# Patient Record
Sex: FEMALE | Race: WHITE | NOT HISPANIC OR LATINO | Employment: FULL TIME | URBAN - METROPOLITAN AREA
[De-identification: names, ages, dates, MRNs, and addresses within clinical notes are randomized per-mention and may not be internally consistent; named-entity substitution may affect disease eponyms.]

---

## 2023-03-20 LAB
ALBUMIN (G/DL) IN SER/PLAS: 4.2 G/DL (ref 3.4–5)
ANION GAP IN SER/PLAS: 13 MMOL/L (ref 10–20)
CALCIUM (MG/DL) IN SER/PLAS: 9.1 MG/DL (ref 8.6–10.3)
CARBON DIOXIDE, TOTAL (MMOL/L) IN SER/PLAS: 26 MMOL/L (ref 21–32)
CHLORIDE (MMOL/L) IN SER/PLAS: 103 MMOL/L (ref 98–107)
CREATININE (MG/DL) IN SER/PLAS: 0.84 MG/DL (ref 0.5–1.05)
GFR FEMALE: 81 ML/MIN/1.73M2
GLUCOSE (MG/DL) IN SER/PLAS: 74 MG/DL (ref 74–99)
PHOSPHATE (MG/DL) IN SER/PLAS: 2.8 MG/DL (ref 2.5–4.9)
POTASSIUM (MMOL/L) IN SER/PLAS: 3.9 MMOL/L (ref 3.5–5.3)
SODIUM (MMOL/L) IN SER/PLAS: 138 MMOL/L (ref 136–145)
THYROTROPIN (MIU/L) IN SER/PLAS BY DETECTION LIMIT <= 0.05 MIU/L: 0.61 MIU/L (ref 0.44–3.98)
THYROXINE (T4) FREE (NG/DL) IN SER/PLAS: 1.09 NG/DL (ref 0.61–1.12)
UREA NITROGEN (MG/DL) IN SER/PLAS: 13 MG/DL (ref 6–23)

## 2023-03-21 LAB — PROGESTERONE (NG/ML) IN SER/PLAS: <0.3 NG/ML

## 2023-03-23 LAB
6-ACETYLMORPHINE: <25 NG/ML
7-AMINOCLONAZEPAM: <25 NG/ML
ALPHA-HYDROXYALPRAZOLAM: <25 NG/ML
ALPHA-HYDROXYMIDAZOLAM: <25 NG/ML
ALPRAZOLAM: <25 NG/ML
AMPHETAMINE (PRESENCE) IN URINE BY SCREEN METHOD: NORMAL
BARBITURATES PRESENCE IN URINE BY SCREEN METHOD: NORMAL
CANNABINOIDS IN URINE BY SCREEN METHOD: NORMAL
CHLORDIAZEPOXIDE: <25 NG/ML
CLONAZEPAM: <25 NG/ML
COCAINE (PRESENCE) IN URINE BY SCREEN METHOD: NORMAL
CODEINE: <50 NG/ML
CREATINE, URINE FOR DRUG: 90.4 MG/DL
DIAZEPAM: <25 NG/ML
DRUG SCREEN COMMENT URINE: NORMAL
EDDP: <25 NG/ML
FENTANYL CONFIRMATION, URINE: <2.5 NG/ML
HYDROCODONE: <25 NG/ML
HYDROMORPHONE: <25 NG/ML
LORAZEPAM: <25 NG/ML
METHADONE CONFIRMATION,URINE: <25 NG/ML
MIDAZOLAM: <25 NG/ML
MORPHINE URINE: <50 NG/ML
NORDIAZEPAM: <25 NG/ML
NORFENTANYL: <2.5 NG/ML
NORHYDROCODONE: <25 NG/ML
NOROXYCODONE: <25 NG/ML
O-DESMETHYLTRAMADOL: <50 NG/ML
OXAZEPAM: <25 NG/ML
OXYCODONE: <25 NG/ML
OXYMORPHONE: <25 NG/ML
PHENCYCLIDINE (PRESENCE) IN URINE BY SCREEN METHOD: NORMAL
TEMAZEPAM: <25 NG/ML
TRAMADOL: <50 NG/ML
ZOLPIDEM METABOLITE (ZCA): <25 NG/ML
ZOLPIDEM: <25 NG/ML

## 2023-03-25 LAB
TESTOSTERONE FREE (CHAN): 1.8 PG/ML (ref 0.1–6.4)
TESTOSTERONE,TOTAL,LC-MS/MS: 10 NG/DL (ref 2–45)

## 2023-03-31 ENCOUNTER — TELEMEDICINE (OUTPATIENT)
Dept: PRIMARY CARE | Facility: CLINIC | Age: 58
End: 2023-03-31
Payer: COMMERCIAL

## 2023-03-31 DIAGNOSIS — F51.01 PRIMARY INSOMNIA: ICD-10-CM

## 2023-03-31 DIAGNOSIS — F41.9 ANXIETY: Primary | ICD-10-CM

## 2023-03-31 PROCEDURE — 99213 OFFICE O/P EST LOW 20 MIN: CPT | Performed by: INTERNAL MEDICINE

## 2023-03-31 RX ORDER — GABAPENTIN 100 MG/1
400 CAPSULE ORAL NIGHTLY
Qty: 120 CAPSULE | Refills: 1 | Status: SHIPPED | OUTPATIENT
Start: 2023-03-31 | End: 2023-04-30

## 2023-03-31 RX ORDER — LORAZEPAM 1 MG/1
1 TABLET ORAL 2 TIMES DAILY PRN
Qty: 6 TABLET | Refills: 0 | Status: SHIPPED | OUTPATIENT
Start: 2023-03-31 | End: 2023-04-03

## 2023-03-31 RX ORDER — TRAZODONE HYDROCHLORIDE 100 MG/1
100 TABLET ORAL NIGHTLY PRN
Qty: 30 TABLET | Refills: 3 | Status: SHIPPED | OUTPATIENT
Start: 2023-03-31 | End: 2023-04-30

## 2023-03-31 RX ORDER — GABAPENTIN 100 MG/1
400 CAPSULE ORAL NIGHTLY
COMMUNITY
Start: 2020-01-17 | End: 2023-03-31 | Stop reason: SDUPTHER

## 2023-03-31 NOTE — PROGRESS NOTES
Telemedicine visit is conducted with the patient with his/ her consent about the medical problem as documented below.   Communication with the patient is face to face over the secured video call.    Subjective   Patient ID: Chelsie Lucio is a 58 y.o. female who called office for worsening of symptoms of Anxiety and is having insomnia and wanted to try some medicine.    HPI   Patient recently had normal urine drug test.    Review of Systems   Constitutional:  Negative for activity change, appetite change, fatigue, fever and unexpected weight change.   HENT:  Negative for dental problem, ear discharge, hearing loss, nosebleeds, postnasal drip, sinus pain, sore throat, trouble swallowing and voice change.    Eyes:  Negative for photophobia, pain and visual disturbance.   Respiratory:  Negative for chest tightness, shortness of breath, wheezing and stridor.    Cardiovascular:  Negative for chest pain, palpitations and leg swelling.   Gastrointestinal:  Negative for abdominal pain, blood in stool, constipation, diarrhea, nausea and vomiting.   Endocrine: Negative for polydipsia, polyphagia and polyuria.   Genitourinary:  Negative for decreased urine volume, dyspareunia, dysuria, flank pain and urgency.   Musculoskeletal:  Negative for back pain, gait problem, myalgias and neck pain.   Skin:  Negative for rash and wound.   Allergic/Immunologic: Negative for environmental allergies and food allergies.   Neurological:  Negative for dizziness, seizures, syncope, facial asymmetry, speech difficulty, weakness, numbness and headaches.   Hematological:  Negative for adenopathy.   Psychiatric/Behavioral:  Negative for behavioral problems, confusion, hallucinations, sleep disturbance and suicidal ideas. The patient is nervous/anxious.        Objective   There were no vitals taken for this visit.    Physical Exam  Virtual Physical Exam  Awake alert and oriented x 3. Following directions and replying to all questions  appropriately.  No use of accessory muscle of respiration. No acute respiratory distress.  Moving all extremities.  Clear bilateral conjunctiva.  No visible skin rash over the face and extremities.  Joints movements of UE and bilateral knee is normal.  Normal mood with appropriate effect and emotions.    Rest of the physical exam is limited due to virtual nature of this visit.    Assessment/Plan   Problem List Items Addressed This Visit    None  Visit Diagnoses       Anxiety    -  Primary    Relevant Medications    LORazepam (Ativan) 1 mg tablet    gabapentin (Neurontin) 100 mg capsule    Primary insomnia        Relevant Medications    traZODone (Desyrel) 100 mg tablet   RTC at your next office visit.

## 2023-04-03 ASSESSMENT — ENCOUNTER SYMPTOMS
NECK PAIN: 0
BLOOD IN STOOL: 0
WEAKNESS: 0
SINUS PAIN: 0
SLEEP DISTURBANCE: 0
MYALGIAS: 0
POLYPHAGIA: 0
DIZZINESS: 0
CHEST TIGHTNESS: 0
NAUSEA: 0
CONFUSION: 0
SHORTNESS OF BREATH: 0
STRIDOR: 0
HEADACHES: 0
CONSTIPATION: 0
WHEEZING: 0
ACTIVITY CHANGE: 0
EYE PAIN: 0
VOMITING: 0
APPETITE CHANGE: 0
TROUBLE SWALLOWING: 0
ABDOMINAL PAIN: 0
WOUND: 0
FEVER: 0
VOICE CHANGE: 0
SEIZURES: 0
NUMBNESS: 0
FATIGUE: 0
SPEECH DIFFICULTY: 0
DYSURIA: 0
POLYDIPSIA: 0
FACIAL ASYMMETRY: 0
DIARRHEA: 0
SORE THROAT: 0
PHOTOPHOBIA: 0
ADENOPATHY: 0
BACK PAIN: 0
NERVOUS/ANXIOUS: 1
UNEXPECTED WEIGHT CHANGE: 0
HALLUCINATIONS: 0
PALPITATIONS: 0
FLANK PAIN: 0

## 2023-04-11 ENCOUNTER — TELEPHONE (OUTPATIENT)
Dept: PRIMARY CARE | Facility: CLINIC | Age: 58
End: 2023-04-11
Payer: COMMERCIAL

## 2023-04-11 DIAGNOSIS — M54.50 ACUTE LOW BACK PAIN WITHOUT SCIATICA, UNSPECIFIED BACK PAIN LATERALITY: Primary | ICD-10-CM

## 2023-04-11 PROBLEM — E78.5 HYPERLIPIDEMIA: Status: ACTIVE | Noted: 2023-04-11

## 2023-04-11 PROBLEM — F43.20 ADJUSTMENT DISORDER: Status: ACTIVE | Noted: 2023-04-11

## 2023-04-11 PROBLEM — J20.9 ACUTE BRONCHITIS: Status: RESOLVED | Noted: 2023-04-11 | Resolved: 2023-04-11

## 2023-04-11 PROBLEM — M25.519 SHOULDER PAIN: Status: ACTIVE | Noted: 2023-04-11

## 2023-04-11 PROBLEM — M85.60 BONE CYST: Status: ACTIVE | Noted: 2023-04-11

## 2023-04-11 PROBLEM — M79.7 FIBROMYALGIA: Status: ACTIVE | Noted: 2023-04-11

## 2023-04-11 PROBLEM — M62.838 MUSCLE SPASM: Status: ACTIVE | Noted: 2023-04-11

## 2023-04-11 PROBLEM — N28.89 RIGHT RENAL MASS: Status: ACTIVE | Noted: 2023-04-11

## 2023-04-11 PROBLEM — M54.9 BACK PAIN: Status: ACTIVE | Noted: 2023-04-11

## 2023-04-11 PROBLEM — L30.4 INTERTRIGO: Status: ACTIVE | Noted: 2023-04-11

## 2023-04-11 PROBLEM — J01.90 ACUTE SINUSITIS: Status: RESOLVED | Noted: 2023-04-11 | Resolved: 2023-04-11

## 2023-04-11 PROBLEM — M25.552 HIP PAIN, LEFT: Status: ACTIVE | Noted: 2023-04-11

## 2023-04-11 PROBLEM — L03.115 CELLULITIS OF LEG WITHOUT FOOT, RIGHT: Status: RESOLVED | Noted: 2023-04-11 | Resolved: 2023-04-11

## 2023-04-11 PROBLEM — E53.8 VITAMIN B12 DEFICIENCY: Status: ACTIVE | Noted: 2023-04-11

## 2023-04-11 PROBLEM — K21.9 GASTROESOPHAGEAL REFLUX DISEASE: Status: ACTIVE | Noted: 2023-04-11

## 2023-04-11 PROBLEM — B37.9 YEAST INFECTION: Status: RESOLVED | Noted: 2023-04-11 | Resolved: 2023-04-11

## 2023-04-11 PROBLEM — E55.9 VITAMIN D DEFICIENCY: Status: ACTIVE | Noted: 2023-04-11

## 2023-04-11 PROBLEM — R29.6 RECURRENT FALLS WHILE WALKING: Status: ACTIVE | Noted: 2023-04-11

## 2023-04-11 PROBLEM — E66.9 OBESITY: Status: ACTIVE | Noted: 2023-04-11

## 2023-04-11 PROBLEM — F41.9 ANXIETY: Status: ACTIVE | Noted: 2023-04-11

## 2023-04-11 PROBLEM — G47.00 INSOMNIA: Status: ACTIVE | Noted: 2023-04-11

## 2023-04-11 PROBLEM — E03.9 HYPOTHYROIDISM: Status: ACTIVE | Noted: 2023-04-11

## 2023-04-11 RX ORDER — VALACYCLOVIR HYDROCHLORIDE 1 G/1
1 TABLET, FILM COATED ORAL EVERY 12 HOURS
COMMUNITY
Start: 2017-12-07 | End: 2024-02-06 | Stop reason: WASHOUT

## 2023-04-11 RX ORDER — OMEPRAZOLE 40 MG/1
1 CAPSULE, DELAYED RELEASE ORAL DAILY
COMMUNITY
Start: 2015-04-15 | End: 2023-05-10 | Stop reason: SDUPTHER

## 2023-04-11 RX ORDER — RESVER/WINE/BFL/GRPSD/PC/C/POM 200MG-60MG
CAPSULE ORAL
COMMUNITY
Start: 2023-02-06

## 2023-04-11 RX ORDER — MONTELUKAST SODIUM 10 MG/1
1 TABLET ORAL DAILY
COMMUNITY
Start: 2018-04-09 | End: 2023-05-10 | Stop reason: SDUPTHER

## 2023-04-11 RX ORDER — METHYLPREDNISOLONE 4 MG/1
TABLET ORAL
Qty: 21 TABLET | Refills: 0 | Status: SHIPPED | OUTPATIENT
Start: 2023-04-11 | End: 2023-04-18

## 2023-04-11 RX ORDER — GLUCOSAMINE/MSM/CHONDROIT SULF 500-166.6
TABLET ORAL
COMMUNITY
Start: 2010-06-03 | End: 2024-05-01 | Stop reason: WASHOUT

## 2023-04-11 RX ORDER — ROSUVASTATIN CALCIUM 5 MG/1
1 TABLET, COATED ORAL DAILY
COMMUNITY
Start: 2019-07-16

## 2023-04-12 NOTE — TELEPHONE ENCOUNTER
Attempted to contact patient by phone multiple times, number is busy. Sent a my chart message to patient.

## 2023-05-05 RX ORDER — MELOXICAM 15 MG/1
TABLET ORAL
COMMUNITY
Start: 2023-03-01 | End: 2023-05-10 | Stop reason: SDUPTHER

## 2023-05-05 RX ORDER — TETANUS TOXOID, REDUCED DIPHTHERIA TOXOID AND ACELLULAR PERTUSSIS VACCINE, ADSORBED 5; 2.5; 8; 8; 2.5 [IU]/.5ML; [IU]/.5ML; UG/.5ML; UG/.5ML; UG/.5ML
SUSPENSION INTRAMUSCULAR
COMMUNITY
Start: 2021-11-17 | End: 2024-02-06 | Stop reason: WASHOUT

## 2023-05-05 RX ORDER — DULOXETIN HYDROCHLORIDE 20 MG/1
1 CAPSULE, DELAYED RELEASE ORAL 2 TIMES DAILY
COMMUNITY
Start: 2021-06-02 | End: 2024-02-06 | Stop reason: WASHOUT

## 2023-05-05 RX ORDER — ESZOPICLONE 3 MG/1
TABLET, FILM COATED ORAL
COMMUNITY
Start: 2010-06-03 | End: 2023-08-04 | Stop reason: SDUPTHER

## 2023-05-05 RX ORDER — LEVOTHYROXINE SODIUM 100 UG/1
1 TABLET ORAL DAILY
COMMUNITY
Start: 2021-06-02 | End: 2024-02-06 | Stop reason: WASHOUT

## 2023-05-05 RX ORDER — ETODOLAC 400 MG/1
TABLET, FILM COATED ORAL
COMMUNITY
Start: 2016-06-29 | End: 2024-02-06 | Stop reason: WASHOUT

## 2023-05-05 RX ORDER — CYCLOBENZAPRINE HCL 10 MG
1 TABLET ORAL 3 TIMES DAILY PRN
COMMUNITY
Start: 2020-10-26 | End: 2023-05-10 | Stop reason: SDUPTHER

## 2023-05-05 RX ORDER — MELOXICAM 7.5 MG/1
TABLET ORAL
COMMUNITY
Start: 2022-11-15 | End: 2023-05-10 | Stop reason: WASHOUT

## 2023-05-05 RX ORDER — FUROSEMIDE 20 MG/1
TABLET ORAL
COMMUNITY
Start: 2022-12-15 | End: 2023-08-04 | Stop reason: WASHOUT

## 2023-05-05 RX ORDER — LEVOTHYROXINE SODIUM 125 UG/1
TABLET ORAL
COMMUNITY
Start: 2023-03-01 | End: 2024-02-06 | Stop reason: WASHOUT

## 2023-05-05 RX ORDER — CETIRIZINE HYDROCHLORIDE, PSEUDOEPHEDRINE HYDROCHLORIDE 5; 120 MG/1; MG/1
1 TABLET, FILM COATED, EXTENDED RELEASE ORAL 2 TIMES DAILY PRN
COMMUNITY

## 2023-05-05 RX ORDER — CLOTRIMAZOLE AND BETAMETHASONE DIPROPIONATE 10; .64 MG/G; MG/G
CREAM TOPICAL 2 TIMES DAILY
COMMUNITY
Start: 2017-12-07 | End: 2024-02-06 | Stop reason: WASHOUT

## 2023-05-05 RX ORDER — ALBUTEROL SULFATE 90 UG/1
AEROSOL, METERED RESPIRATORY (INHALATION)
COMMUNITY
End: 2024-02-03 | Stop reason: SDUPTHER

## 2023-05-05 RX ORDER — ASPIRIN 81 MG/1
1 TABLET ORAL DAILY
COMMUNITY

## 2023-05-05 RX ORDER — FLUTICASONE PROPIONATE 50 MCG
SPRAY, SUSPENSION (ML) NASAL
COMMUNITY
Start: 2019-03-23 | End: 2023-07-05

## 2023-05-05 RX ORDER — ESTRADIOL 0.05 MG/D
FILM, EXTENDED RELEASE TRANSDERMAL
COMMUNITY
Start: 2010-06-03 | End: 2023-05-10 | Stop reason: SDUPTHER

## 2023-05-05 RX ORDER — HYDROCODONE BITARTRATE AND ACETAMINOPHEN 5; 325 MG/1; MG/1
TABLET ORAL
COMMUNITY
Start: 2022-06-14 | End: 2024-02-06 | Stop reason: WASHOUT

## 2023-05-05 RX ORDER — IBUPROFEN 600 MG/1
TABLET ORAL
COMMUNITY
Start: 2022-06-13

## 2023-05-10 ENCOUNTER — TELEMEDICINE (OUTPATIENT)
Dept: PRIMARY CARE | Facility: CLINIC | Age: 58
End: 2023-05-10
Payer: COMMERCIAL

## 2023-05-10 DIAGNOSIS — K21.9 GASTROESOPHAGEAL REFLUX DISEASE WITHOUT ESOPHAGITIS: ICD-10-CM

## 2023-05-10 DIAGNOSIS — N89.8 VAGINAL DRYNESS: ICD-10-CM

## 2023-05-10 DIAGNOSIS — R06.02 SHORTNESS OF BREATH: ICD-10-CM

## 2023-05-10 DIAGNOSIS — M79.2 NEUROPATHIC PAIN: Primary | ICD-10-CM

## 2023-05-10 PROCEDURE — 99213 OFFICE O/P EST LOW 20 MIN: CPT | Performed by: INTERNAL MEDICINE

## 2023-05-10 RX ORDER — ESTRADIOL 0.05 MG/D
1 FILM, EXTENDED RELEASE TRANSDERMAL
Qty: 12 PATCH | Refills: 1 | Status: SHIPPED | OUTPATIENT
Start: 2023-05-10 | End: 2023-08-04

## 2023-05-10 RX ORDER — GABAPENTIN 400 MG/1
400 CAPSULE ORAL 3 TIMES DAILY
Qty: 90 CAPSULE | Refills: 1 | Status: SHIPPED | OUTPATIENT
Start: 2023-05-10 | End: 2023-07-05

## 2023-05-10 RX ORDER — MONTELUKAST SODIUM 10 MG/1
10 TABLET ORAL DAILY
Qty: 90 TABLET | Refills: 1 | Status: SHIPPED | OUTPATIENT
Start: 2023-05-10 | End: 2023-09-28

## 2023-05-10 RX ORDER — NALOXONE HYDROCHLORIDE 4 MG/.1ML
4 SPRAY NASAL AS NEEDED
Qty: 2 EACH | Refills: 1 | Status: SHIPPED | OUTPATIENT
Start: 2023-05-10 | End: 2024-02-06 | Stop reason: WASHOUT

## 2023-05-10 RX ORDER — CYCLOBENZAPRINE HCL 10 MG
10 TABLET ORAL 3 TIMES DAILY PRN
Qty: 260 TABLET | Refills: 1 | Status: SHIPPED | OUTPATIENT
Start: 2023-05-10 | End: 2023-08-08

## 2023-05-10 RX ORDER — OMEPRAZOLE 40 MG/1
40 CAPSULE, DELAYED RELEASE ORAL DAILY
Qty: 90 CAPSULE | Refills: 1 | Status: SHIPPED | OUTPATIENT
Start: 2023-05-10 | End: 2023-09-28

## 2023-05-10 RX ORDER — GABAPENTIN 100 MG/1
100 CAPSULE ORAL 3 TIMES DAILY
Qty: 90 CAPSULE | Refills: 1 | Status: SHIPPED | OUTPATIENT
Start: 2023-05-10 | End: 2023-07-05

## 2023-05-10 RX ORDER — MELOXICAM 15 MG/1
TABLET ORAL
Qty: 90 TABLET | Refills: 1 | Status: SHIPPED | OUTPATIENT
Start: 2023-05-10 | End: 2023-09-28

## 2023-05-10 ASSESSMENT — ENCOUNTER SYMPTOMS
SINUS PAIN: 0
WOUND: 0
POLYPHAGIA: 0
DYSURIA: 0
DIZZINESS: 0
WHEEZING: 0
PALPITATIONS: 0
ABDOMINAL PAIN: 0
VOICE CHANGE: 0
SEIZURES: 0
ADENOPATHY: 0
CONFUSION: 0
FACIAL ASYMMETRY: 0
STRIDOR: 0
MYALGIAS: 1
BACK PAIN: 0
VOMITING: 0
WEAKNESS: 0
SLEEP DISTURBANCE: 0
FEVER: 0
SHORTNESS OF BREATH: 0
NAUSEA: 0
CONSTIPATION: 0
SPEECH DIFFICULTY: 0
DIARRHEA: 0
PHOTOPHOBIA: 0
BLOOD IN STOOL: 0
NERVOUS/ANXIOUS: 0
ARTHRALGIAS: 1
NECK PAIN: 0
NUMBNESS: 0
HALLUCINATIONS: 0
EYE PAIN: 0
HEADACHES: 0
UNEXPECTED WEIGHT CHANGE: 0
ACTIVITY CHANGE: 0
APPETITE CHANGE: 0
CHEST TIGHTNESS: 0
FATIGUE: 0
TROUBLE SWALLOWING: 0
SORE THROAT: 0
FLANK PAIN: 0
POLYDIPSIA: 0

## 2023-05-10 NOTE — PROGRESS NOTES
Telemedicine visit is conducted with the patient with her consent about the medical problem as documented below.   Communication with the patient is face to face over the secured video call.    Subjective   Patient ID: Chelsie Lucio is a 58 y.o. female who called office to get prescription renewed. She still have baseline Neuropathic pain and after increasing gabapentin she feels little better and want to continue same dose and also want to try additional 100 mg of Gabapentin in the day if it control her symptoms more without side effects.    HPI   She denied for any other specific symptoms.    Review of Systems   Constitutional:  Negative for activity change, appetite change, fatigue, fever and unexpected weight change.   HENT:  Negative for dental problem, ear discharge, hearing loss, nosebleeds, postnasal drip, sinus pain, sore throat, trouble swallowing and voice change.    Eyes:  Negative for photophobia, pain and visual disturbance.   Respiratory:  Negative for chest tightness, shortness of breath, wheezing and stridor.    Cardiovascular:  Negative for chest pain, palpitations and leg swelling.   Gastrointestinal:  Negative for abdominal pain, blood in stool, constipation, diarrhea, nausea and vomiting.   Endocrine: Negative for polydipsia, polyphagia and polyuria.   Genitourinary:  Negative for decreased urine volume, dyspareunia, dysuria, flank pain and urgency.   Musculoskeletal:  Positive for arthralgias and myalgias. Negative for back pain, gait problem and neck pain.   Skin:  Negative for rash and wound.   Allergic/Immunologic: Negative for environmental allergies and food allergies.   Neurological:  Negative for dizziness, seizures, syncope, facial asymmetry, speech difficulty, weakness, numbness and headaches.   Hematological:  Negative for adenopathy.   Psychiatric/Behavioral:  Negative for behavioral problems, confusion, hallucinations, sleep disturbance and suicidal ideas. The patient is not  nervous/anxious.      Objective   There were no vitals taken for this visit.    Physical Exam  Virtual Physical Exam  Awake alert and oriented x 3. Following directions and replying to all questions appropriately.  No use of accessory muscle of respiration. No acute respiratory distress.  Moving all extremities.  Clear bilateral conjunctiva.  No visible skin rash over the face and extremities.  Joints movements of UE and bilateral knee is normal.  Normal mood with appropriate effect and emotions.    Rest of the physical exam is limited due to virtual nature of this visit.    Assessment/Plan   Problem List Items Addressed This Visit          Digestive    Gastroesophageal reflux disease    Relevant Medications    omeprazole (PriLOSEC) 40 mg DR capsule     Other Visit Diagnoses       Neuropathic pain    -  Primary    Relevant Medications    gabapentin (Neurontin) 400 mg capsule    gabapentin (Neurontin) 100 mg capsule    cyclobenzaprine (Flexeril) 10 mg tablet    naloxone (Narcan) 4 mg/0.1 mL nasal spray    Shortness of breath        Relevant Medications    montelukast (Singulair) 10 mg tablet    meloxicam (Mobic) 15 mg tablet    Vaginal dryness        Relevant Medications    estradiol (Vivelle-DOT) 0.05 mg/24 hr patch

## 2023-06-30 DIAGNOSIS — J30.9 ALLERGIC RHINITIS, UNSPECIFIED SEASONALITY, UNSPECIFIED TRIGGER: Primary | ICD-10-CM

## 2023-06-30 DIAGNOSIS — M79.2 NEUROPATHIC PAIN: ICD-10-CM

## 2023-06-30 RX ORDER — CELECOXIB 100 MG/1
CAPSULE ORAL
COMMUNITY
Start: 2023-05-16

## 2023-07-05 RX ORDER — GABAPENTIN 400 MG/1
CAPSULE ORAL
Qty: 180 CAPSULE | Refills: 5 | Status: SHIPPED | OUTPATIENT
Start: 2023-07-05 | End: 2024-02-06 | Stop reason: WASHOUT

## 2023-07-05 RX ORDER — GABAPENTIN 100 MG/1
CAPSULE ORAL
Qty: 180 CAPSULE | Refills: 5 | Status: SHIPPED | OUTPATIENT
Start: 2023-07-05

## 2023-07-05 RX ORDER — FLUTICASONE PROPIONATE 50 MCG
SPRAY, SUSPENSION (ML) NASAL
Qty: 48 G | Refills: 1 | Status: SHIPPED | OUTPATIENT
Start: 2023-07-05 | End: 2023-12-05

## 2023-08-03 DIAGNOSIS — Z13.820 ENCOUNTER FOR SCREENING FOR OSTEOPOROSIS: ICD-10-CM

## 2023-08-03 DIAGNOSIS — Z12.31 ENCOUNTER FOR SCREENING MAMMOGRAM FOR MALIGNANT NEOPLASM OF BREAST: ICD-10-CM

## 2023-08-04 ENCOUNTER — OFFICE VISIT (OUTPATIENT)
Dept: PRIMARY CARE | Facility: CLINIC | Age: 58
End: 2023-08-04
Payer: COMMERCIAL

## 2023-08-04 ENCOUNTER — TELEMEDICINE (OUTPATIENT)
Dept: PRIMARY CARE | Facility: CLINIC | Age: 58
End: 2023-08-04
Payer: COMMERCIAL

## 2023-08-04 DIAGNOSIS — N89.8 VAGINAL DRYNESS: ICD-10-CM

## 2023-08-04 DIAGNOSIS — F51.01 PRIMARY INSOMNIA: ICD-10-CM

## 2023-08-04 DIAGNOSIS — E78.2 MIXED HYPERLIPIDEMIA: ICD-10-CM

## 2023-08-04 DIAGNOSIS — R73.9 HYPERGLYCEMIA: ICD-10-CM

## 2023-08-04 DIAGNOSIS — R60.9 SWELLING: Primary | ICD-10-CM

## 2023-08-04 DIAGNOSIS — E55.9 VITAMIN D DEFICIENCY: ICD-10-CM

## 2023-08-04 DIAGNOSIS — Z11.59 NEED FOR HEPATITIS C SCREENING TEST: Primary | ICD-10-CM

## 2023-08-04 DIAGNOSIS — Z13.29 THYROID DISORDER SCREENING: ICD-10-CM

## 2023-08-04 DIAGNOSIS — Z11.4 ENCOUNTER FOR SCREENING FOR HIV: ICD-10-CM

## 2023-08-04 PROCEDURE — 99214 OFFICE O/P EST MOD 30 MIN: CPT | Performed by: INTERNAL MEDICINE

## 2023-08-04 PROCEDURE — 1036F TOBACCO NON-USER: CPT | Performed by: INTERNAL MEDICINE

## 2023-08-04 RX ORDER — ESTRADIOL 0.05 MG/D
FILM, EXTENDED RELEASE TRANSDERMAL
Qty: 24 PATCH | Refills: 2 | Status: SHIPPED | OUTPATIENT
Start: 2023-08-04 | End: 2024-03-14

## 2023-08-04 RX ORDER — ESZOPICLONE 3 MG/1
3 TABLET, FILM COATED ORAL NIGHTLY
Qty: 60 TABLET | Refills: 0 | Status: SHIPPED | OUTPATIENT
Start: 2023-08-04 | End: 2023-09-11 | Stop reason: SDUPTHER

## 2023-08-04 RX ORDER — FUROSEMIDE 40 MG/1
40 TABLET ORAL DAILY
Qty: 30 TABLET | Refills: 2 | Status: SHIPPED | OUTPATIENT
Start: 2023-08-04 | End: 2024-08-03

## 2023-08-04 ASSESSMENT — ANXIETY QUESTIONNAIRES
7. FEELING AFRAID AS IF SOMETHING AWFUL MIGHT HAPPEN: NOT AT ALL
3. WORRYING TOO MUCH ABOUT DIFFERENT THINGS: NOT AT ALL
2. NOT BEING ABLE TO STOP OR CONTROL WORRYING: NOT AT ALL
5. BEING SO RESTLESS THAT IT IS HARD TO SIT STILL: SEVERAL DAYS
1. FEELING NERVOUS, ANXIOUS, OR ON EDGE: MORE THAN HALF THE DAYS
4. TROUBLE RELAXING: SEVERAL DAYS
6. BECOMING EASILY ANNOYED OR IRRITABLE: NOT AT ALL
GAD7 TOTAL SCORE: 4
IF YOU CHECKED OFF ANY PROBLEMS ON THIS QUESTIONNAIRE, HOW DIFFICULT HAVE THESE PROBLEMS MADE IT FOR YOU TO DO YOUR WORK, TAKE CARE OF THINGS AT HOME, OR GET ALONG WITH OTHER PEOPLE: SOMEWHAT DIFFICULT

## 2023-08-04 ASSESSMENT — ENCOUNTER SYMPTOMS
APPETITE CHANGE: 0
UNEXPECTED WEIGHT CHANGE: 0
FREQUENCY: 0
FEVER: 0
SHORTNESS OF BREATH: 0
NUMBNESS: 0
UNEXPECTED WEIGHT CHANGE: 0
SORE THROAT: 0
COUGH: 0
POLYDIPSIA: 0
POLYPHAGIA: 0
PALPITATIONS: 0
FEVER: 0
SHORTNESS OF BREATH: 0
SORE THROAT: 0
ARTHRALGIAS: 0
HALLUCINATIONS: 0
TREMORS: 0
VOICE CHANGE: 0
FLANK PAIN: 0
COUGH: 0
FACIAL ASYMMETRY: 0
SINUS PAIN: 0
ABDOMINAL PAIN: 0
NAUSEA: 0
CONSTIPATION: 0
HEMATURIA: 0
NERVOUS/ANXIOUS: 0
PHOTOPHOBIA: 0
HEADACHES: 0
WHEEZING: 0
SPEECH DIFFICULTY: 0
CONSTIPATION: 0
ABDOMINAL PAIN: 0
SLEEP DISTURBANCE: 0
PHOTOPHOBIA: 0
ARTHRALGIAS: 1
FACIAL ASYMMETRY: 0
HALLUCINATIONS: 0
DYSURIA: 0
MYALGIAS: 1
ACTIVITY CHANGE: 0
VOICE CHANGE: 0
VOMITING: 0
HEADACHES: 0
NECK PAIN: 0
DIARRHEA: 0
STRIDOR: 0
SEIZURES: 0
TROUBLE SWALLOWING: 0
BLOOD IN STOOL: 0
EYE PAIN: 0
WOUND: 0
ACTIVITY CHANGE: 0
CONFUSION: 0
BACK PAIN: 0
TREMORS: 0
POLYDIPSIA: 0
CHEST TIGHTNESS: 0
ADENOPATHY: 0
BACK PAIN: 0
DIARRHEA: 0
CONFUSION: 0
STRIDOR: 0
ADENOPATHY: 0
APPETITE CHANGE: 0
SLEEP DISTURBANCE: 1
FATIGUE: 0
POLYPHAGIA: 0
SEIZURES: 0
NUMBNESS: 0
DYSURIA: 0
DIZZINESS: 0
SPEECH DIFFICULTY: 0
HEMATURIA: 0
PALPITATIONS: 0
NERVOUS/ANXIOUS: 0
WEAKNESS: 0
WHEEZING: 0
SINUS PAIN: 0
NAUSEA: 0
BLOOD IN STOOL: 0
EYE PAIN: 0
CHEST TIGHTNESS: 0
FLANK PAIN: 0
WOUND: 0
DIZZINESS: 0
FATIGUE: 0
WEAKNESS: 0
VOMITING: 0
NECK PAIN: 0
TROUBLE SWALLOWING: 0
MYALGIAS: 0

## 2023-08-04 NOTE — PROGRESS NOTES
Telemedicine visit is conducted with the patient with her consent about the medical problem as documented below.   Communication with the patient is face to face over the secured video call.    Subjective   Patient ID: Chelsie Lucio is a 58 y.o. female who presents for No chief complaint on file..    HPI   She is having swelling on all body. She was given Lasix before but patient did not take it.    She also has difficulty in sleeping and Trazodone is not working even after increasing the dose and she would like to try Lunesta again.    She is still rotating gabapentin between 100 to 400 mg per day based on symptoms.    She want estrogen patch for vaginal dryness and she doesn't remember the name and will call my office back later to update it.    Review of Systems   Constitutional:  Negative for activity change, appetite change, fatigue, fever and unexpected weight change.   HENT:  Negative for dental problem, ear discharge, hearing loss, nosebleeds, postnasal drip, sinus pain, sore throat, trouble swallowing and voice change.    Eyes:  Negative for photophobia, pain and visual disturbance.   Respiratory:  Negative for cough, chest tightness, shortness of breath, wheezing and stridor.    Cardiovascular:  Positive for leg swelling. Negative for chest pain and palpitations.   Gastrointestinal:  Negative for abdominal pain, blood in stool, constipation, diarrhea, nausea and vomiting.   Endocrine: Negative for polydipsia, polyphagia and polyuria.   Genitourinary:  Negative for decreased urine volume, dyspareunia, dysuria, flank pain, hematuria and urgency.   Musculoskeletal:  Positive for arthralgias and myalgias. Negative for back pain, gait problem and neck pain.   Skin:  Negative for rash and wound.   Allergic/Immunologic: Negative for environmental allergies and food allergies.   Neurological:  Negative for dizziness, tremors, seizures, syncope, facial asymmetry, speech difficulty, weakness, numbness and headaches.    Hematological:  Negative for adenopathy.   Psychiatric/Behavioral:  Positive for sleep disturbance. Negative for behavioral problems, confusion, hallucinations and suicidal ideas. The patient is not nervous/anxious.      Objective   There were no vitals taken for this visit.    Physical Exam  Virtual Physical Exam  Awake alert and oriented x 3. Following directions and replying to all questions appropriately.  No use of accessory muscle of respiration. No acute respiratory distress.  Moving all extremities.  Clear bilateral conjunctiva.  No visible skin rash over the face and extremities.  Joints movements of UE and bilateral knee is normal.  Normal mood with appropriate effect and emotions.    Rest of the physical exam is limited due to virtual nature of this visit.    Assessment/Plan   Problem List Items Addressed This Visit          Genitourinary and Reproductive    Vaginal dryness     Call office about specific estradiol patch so I can send it to the pharmacy.            Sleep    Insomnia     Please come to the office to get contract signed for Lunesta.            Symptoms and Signs    Swelling - Primary    Relevant Medications    furosemide (Lasix) 40 mg tablet

## 2023-08-04 NOTE — PROGRESS NOTES
Subjective   Patient ID: Chelsie Lucio is a 58 y.o. female who presents for No chief complaint on file..    HPI   Patient presented to the office for follow up to get labs and also need medicine Lunesta as trazodone is not working.     She also need labs which has been ordered today.    Patient also noticed swelling of all over the body especially legs and she was given Lasix in the past but she did not take it.    She is still rotating gabapentin between 100 to 400 mg per day based on symptoms.     She want estrogen patch for vaginal dryness and she doesn't remember the name and will call my office back later to update it.    Review of Systems   Constitutional:  Negative for activity change, appetite change, fatigue, fever and unexpected weight change.   HENT:  Negative for dental problem, ear discharge, hearing loss, nosebleeds, postnasal drip, sinus pain, sore throat, trouble swallowing and voice change.    Eyes:  Negative for photophobia, pain and visual disturbance.   Respiratory:  Negative for cough, chest tightness, shortness of breath, wheezing and stridor.    Cardiovascular:  Positive for leg swelling. Negative for chest pain and palpitations.   Gastrointestinal:  Negative for abdominal pain, blood in stool, constipation, diarrhea, nausea and vomiting.   Endocrine: Negative for polydipsia, polyphagia and polyuria.   Genitourinary:  Negative for decreased urine volume, dyspareunia, dysuria, flank pain, frequency, hematuria and urgency.   Musculoskeletal:  Negative for arthralgias, back pain, gait problem, myalgias and neck pain.   Skin:  Negative for rash and wound.   Allergic/Immunologic: Negative for environmental allergies and food allergies.   Neurological:  Negative for dizziness, tremors, seizures, syncope, facial asymmetry, speech difficulty, weakness, numbness and headaches.   Hematological:  Negative for adenopathy.   Psychiatric/Behavioral:  Negative for behavioral problems, confusion,  hallucinations, sleep disturbance and suicidal ideas. The patient is not nervous/anxious.      Objective   There were no vitals taken for this visit.    Physical Exam  Constitutional:       General: She is not in acute distress.     Appearance: Normal appearance. She is obese. She is not ill-appearing or toxic-appearing.   HENT:      Head: Normocephalic and atraumatic.      Nose: Nose normal.   Eyes:      General:         Right eye: No discharge.         Left eye: No discharge.      Extraocular Movements: Extraocular movements intact.      Conjunctiva/sclera: Conjunctivae normal.      Pupils: Pupils are equal, round, and reactive to light.   Cardiovascular:      Rate and Rhythm: Normal rate and regular rhythm.      Pulses: Normal pulses.      Heart sounds: Normal heart sounds. No murmur heard.     No gallop.   Pulmonary:      Effort: Pulmonary effort is normal. No respiratory distress.      Breath sounds: Normal breath sounds. No stridor. No wheezing or rales.   Musculoskeletal:         General: No swelling or deformity. Normal range of motion.      Cervical back: Normal range of motion and neck supple. No rigidity or tenderness.      Right lower leg: No edema.      Left lower leg: No edema.   Skin:     General: Skin is warm.      Coloration: Skin is not jaundiced.      Findings: No bruising, erythema or rash.   Neurological:      General: No focal deficit present.      Mental Status: She is alert and oriented to person, place, and time.      Cranial Nerves: No cranial nerve deficit.      Gait: Gait normal.   Psychiatric:         Mood and Affect: Mood normal.         Behavior: Behavior normal.         Thought Content: Thought content normal.         Judgment: Judgment normal.       Assessment/Plan   Problem List Items Addressed This Visit          Cardiac and Vasculature    Hyperlipidemia    Relevant Orders    CBC and Auto Differential    Comprehensive Metabolic Panel    Lipid Panel       Endocrine/Metabolic     Vitamin D deficiency    Relevant Orders    Vitamin D, Total       Sleep    Insomnia    Relevant Medications    eszopiclone (Lunesta) 3 mg tablet     Other Visit Diagnoses       Need for hepatitis C screening test    -  Primary    Relevant Orders    Hepatitis C Antibody    Hyperglycemia        Relevant Orders    Hemoglobin A1C    Encounter for screening for HIV        Relevant Orders    HIV 1/2 Antigen/Antibody Screen with Reflex to Confirmation    Thyroid disorder screening        Relevant Orders    TSH with reflex to Free T4 if abnormal        [facility]:  Aquiles Mcclain MD on 8/4/2023 10:48 PM  I have personally reviewed the [facility] report for Chelsie Lucio. I have considered the risks of abuse, dependence, addiction and diversion and I believe that it is clinically appropriate for Chelsie Lucio to be prescribed this medication    Is the patient prescribed a combination of a benzodiazepine and opioid?  Yes, I feel it is clincially indicated to continue the medication and have discussed with the patient risks/benefits/alternatives.    Last Urine Drug Screen / ordered today: No  Recent Results (from the past 88128 hour(s))   OPIATE/OPIOID/BENZO PRESCRIPTION COMPLIANCE    Collection Time: 03/20/23  4:07 PM   Result Value Ref Range    DRUG SCREEN COMMENT URINE SEE BELOW     Creatine, Urine 90.4 mg/dL    Amphetamine Screen, Urine PRESUMPTIVE NEGATIVE NEGATIVE    Barbiturate Screen, Urine PRESUMPTIVE NEGATIVE NEGATIVE    Cannabinoid Screen, Urine PRESUMPTIVE NEGATIVE NEGATIVE    Cocaine Screen, Urine PRESUMPTIVE NEGATIVE NEGATIVE    PCP Screen, Urine PRESUMPTIVE NEGATIVE NEGATIVE    7-Aminoclonazepam <25 Cutoff <25 ng/mL    Alpha-Hydroxyalprazolam <25 Cutoff <25 ng/mL    Alpha-Hydroxymidazolam <25 Cutoff <25 ng/mL    Alprazolam <25 Cutoff <25 ng/mL    Chlordiazepoxide <25 Cutoff <25 ng/mL    Clonazepam <25 Cutoff <25 ng/mL    Diazepam <25 Cutoff <25 ng/mL    Lorazepam <25 Cutoff <25 ng/mL    Midazolam <25 Cutoff <25 ng/mL     Nordiazepam <25 Cutoff <25 ng/mL    Oxazepam <25 Cutoff <25 ng/mL    Temazepam <25 Cutoff <25 ng/mL    Zolpidem <25 Cutoff <25 ng/mL    Zolpidem Metabolite (ZCA) <25 Cutoff <25 ng/mL    6-Acetylmorphine <25 Cutoff <25 ng/mL    Codeine <50 Cutoff <50 ng/mL    Hydrocodone <25 Cutoff <25 ng/mL    Hydromorphone <25 Cutoff <25 ng/mL    Morphine Urine <50 Cutoff <50 ng/mL    Norhydrocodone <25 Cutoff <25 ng/mL    Noroxycodone <25 Cutoff <25 ng/mL    Oxycodone <25 Cutoff <25 ng/mL    Oxymorphone <25 Cutoff <25 ng/mL    Tramadol <50 Cutoff <50 ng/mL    O-Desmethyltramadol <50 Cutoff <50 ng/mL    Fentanyl <2.5 Cutoff<2.5 ng/mL    Norfentanyl <2.5 Cutoff<2.5 ng/mL    METHADONE CONFIRMATION,URINE <25 Cutoff <25 ng/mL    EDDP <25 Cutoff <25 ng/mL     Results are as expected.     Controlled Substance Agreement:  Date of the Last Agreement: 2023  Reviewed Controlled Substance Agreement including but not limited to the benefits, risks, and alternatives to treatment with a Controlled Substance medication(s).    Benzodiazepines:  What is the patient's goal of therapy? To sleep better  Is this being achieved with current treatment? It was being achieved in the past.    FRANCOIS-7:  Over the last 2 weeks, how often have you been bothered by any of the following problems?  Feeling nervous, anxious, or on edge: 2  Not being able to stop or control worryin  Worrying too much about different things: 0  Trouble relaxin  Being so restless that it is hard to sit still: 1  Becoming easily annoyed or irritable: 0  Feeling afraid as if something awful might happen: 0  FRANCOIS-7 Total Score: 4      Activities of Daily Living:   Is your overall impression that this patient is benefiting (symptom reduction outweighs side effects) from benzodiazepine therapy? Will start treatment today.    1. Physical Functioning: Same  2. Family Relationship: Same  3. Social Relationship: Same  4. Mood: Same  5. Sleep Patterns: Same  6. Overall Function: Same

## 2023-08-07 ENCOUNTER — TELEPHONE (OUTPATIENT)
Dept: PRIMARY CARE | Facility: CLINIC | Age: 58
End: 2023-08-07
Payer: COMMERCIAL

## 2023-08-07 DIAGNOSIS — R11.0 NAUSEA: Primary | ICD-10-CM

## 2023-08-07 RX ORDER — ONDANSETRON 4 MG/1
4 TABLET, FILM COATED ORAL EVERY 8 HOURS PRN
Qty: 30 TABLET | Refills: 0 | Status: SHIPPED | OUTPATIENT
Start: 2023-08-07 | End: 2023-08-21

## 2023-08-07 NOTE — TELEPHONE ENCOUNTER
Patient seen Friday. Started furosemide 40mg. C/o nausea. Is there something she can take for nausea? Or should she cut medication in half? Has not taken medication today.

## 2023-09-11 DIAGNOSIS — F51.01 PRIMARY INSOMNIA: ICD-10-CM

## 2023-09-11 RX ORDER — ESZOPICLONE 3 MG/1
3 TABLET, FILM COATED ORAL NIGHTLY
Qty: 90 TABLET | Refills: 0 | Status: SHIPPED | OUTPATIENT
Start: 2023-09-11 | End: 2023-12-05

## 2023-09-27 ENCOUNTER — TELEPHONE (OUTPATIENT)
Dept: PRIMARY CARE | Facility: CLINIC | Age: 58
End: 2023-09-27
Payer: COMMERCIAL

## 2023-09-27 DIAGNOSIS — K21.9 GASTROESOPHAGEAL REFLUX DISEASE WITHOUT ESOPHAGITIS: ICD-10-CM

## 2023-09-27 DIAGNOSIS — R06.02 SHORTNESS OF BREATH: ICD-10-CM

## 2023-09-27 DIAGNOSIS — R30.0 DYSURIA: Primary | ICD-10-CM

## 2023-09-27 NOTE — TELEPHONE ENCOUNTER
Patient called in wanting to know if you could order a UA for her to get done in [place]. States she is having burning, cloudy urine, itching and irritation. Patient states she can not come in to be seen this week because she has too much going one. States she can come in next week. Last seen 8/4

## 2023-09-28 RX ORDER — MELOXICAM 15 MG/1
TABLET ORAL
Qty: 90 TABLET | Refills: 3 | Status: SHIPPED | OUTPATIENT
Start: 2023-09-28

## 2023-09-28 RX ORDER — OMEPRAZOLE 40 MG/1
40 CAPSULE, DELAYED RELEASE ORAL DAILY
Qty: 90 CAPSULE | Refills: 3 | Status: SHIPPED | OUTPATIENT
Start: 2023-09-28

## 2023-09-28 RX ORDER — MONTELUKAST SODIUM 10 MG/1
10 TABLET ORAL DAILY
Qty: 90 TABLET | Refills: 3 | Status: SHIPPED | OUTPATIENT
Start: 2023-09-28

## 2023-12-04 DIAGNOSIS — J30.9 ALLERGIC RHINITIS, UNSPECIFIED SEASONALITY, UNSPECIFIED TRIGGER: ICD-10-CM

## 2023-12-04 DIAGNOSIS — F51.01 PRIMARY INSOMNIA: ICD-10-CM

## 2023-12-05 RX ORDER — ESZOPICLONE 3 MG/1
3 TABLET, FILM COATED ORAL NIGHTLY
Qty: 90 TABLET | Refills: 0 | Status: SHIPPED | OUTPATIENT
Start: 2023-12-05 | End: 2024-02-03 | Stop reason: SDUPTHER

## 2023-12-05 RX ORDER — FLUTICASONE PROPIONATE 50 MCG
SPRAY, SUSPENSION (ML) NASAL
Qty: 48 G | Refills: 1 | Status: SHIPPED | OUTPATIENT
Start: 2023-12-05 | End: 2024-05-16 | Stop reason: SDUPTHER

## 2023-12-27 ENCOUNTER — TELEPHONE (OUTPATIENT)
Dept: PRIMARY CARE | Facility: CLINIC | Age: 58
End: 2023-12-27
Payer: COMMERCIAL

## 2023-12-27 DIAGNOSIS — M19.90 ARTHRITIS: Primary | ICD-10-CM

## 2023-12-27 NOTE — TELEPHONE ENCOUNTER
Patient states has cpe in February. Wanting to get labs now due to met deductible. Wanting creatinine and DEBRA panel as well.

## 2023-12-28 ENCOUNTER — ANCILLARY PROCEDURE (OUTPATIENT)
Dept: RADIOLOGY | Facility: CLINIC | Age: 58
End: 2023-12-28
Payer: COMMERCIAL

## 2023-12-28 ENCOUNTER — LAB (OUTPATIENT)
Dept: LAB | Facility: LAB | Age: 58
End: 2023-12-28
Payer: COMMERCIAL

## 2023-12-28 DIAGNOSIS — M25.561 CHRONIC PAIN OF BOTH KNEES: Primary | ICD-10-CM

## 2023-12-28 DIAGNOSIS — M19.90 ARTHRITIS: ICD-10-CM

## 2023-12-28 DIAGNOSIS — Z13.29 THYROID DISORDER SCREENING: ICD-10-CM

## 2023-12-28 DIAGNOSIS — G89.29 CHRONIC PAIN OF BOTH KNEES: Primary | ICD-10-CM

## 2023-12-28 DIAGNOSIS — E78.2 MIXED HYPERLIPIDEMIA: ICD-10-CM

## 2023-12-28 DIAGNOSIS — Z79.899 MEDICATION MANAGEMENT: ICD-10-CM

## 2023-12-28 DIAGNOSIS — M25.561 CHRONIC PAIN OF BOTH KNEES: ICD-10-CM

## 2023-12-28 DIAGNOSIS — Z11.59 NEED FOR HEPATITIS C SCREENING TEST: ICD-10-CM

## 2023-12-28 DIAGNOSIS — R30.0 DYSURIA: ICD-10-CM

## 2023-12-28 DIAGNOSIS — Z11.4 ENCOUNTER FOR SCREENING FOR HIV: ICD-10-CM

## 2023-12-28 DIAGNOSIS — M25.562 CHRONIC PAIN OF BOTH KNEES: Primary | ICD-10-CM

## 2023-12-28 DIAGNOSIS — G89.29 CHRONIC PAIN OF BOTH KNEES: ICD-10-CM

## 2023-12-28 DIAGNOSIS — E55.9 VITAMIN D DEFICIENCY: ICD-10-CM

## 2023-12-28 DIAGNOSIS — M25.562 CHRONIC PAIN OF BOTH KNEES: ICD-10-CM

## 2023-12-28 DIAGNOSIS — R73.9 HYPERGLYCEMIA: ICD-10-CM

## 2023-12-28 LAB
ALBUMIN SERPL BCP-MCNC: 4.2 G/DL (ref 3.4–5)
ALP SERPL-CCNC: 56 U/L (ref 33–110)
ALT SERPL W P-5'-P-CCNC: 19 U/L (ref 7–45)
AMORPH CRY #/AREA UR COMP ASSIST: ABNORMAL /HPF
ANION GAP SERPL CALC-SCNC: 14 MMOL/L (ref 10–20)
APPEARANCE UR: ABNORMAL
AST SERPL W P-5'-P-CCNC: 14 U/L (ref 9–39)
BACTERIA #/AREA URNS AUTO: ABNORMAL /HPF
BASOPHILS # BLD AUTO: 0.05 X10*3/UL (ref 0–0.1)
BASOPHILS NFR BLD AUTO: 0.7 %
BILIRUB SERPL-MCNC: 0.4 MG/DL (ref 0–1.2)
BILIRUB UR STRIP.AUTO-MCNC: NEGATIVE MG/DL
BUN SERPL-MCNC: 17 MG/DL (ref 6–23)
CALCIUM SERPL-MCNC: 9.1 MG/DL (ref 8.6–10.3)
CHLORIDE SERPL-SCNC: 107 MMOL/L (ref 98–107)
CHOLEST SERPL-MCNC: 251 MG/DL (ref 0–199)
CHOLESTEROL/HDL RATIO: 4.9
CO2 SERPL-SCNC: 26 MMOL/L (ref 21–32)
COLOR UR: YELLOW
CREAT SERPL-MCNC: 0.75 MG/DL (ref 0.5–1.05)
EOSINOPHIL # BLD AUTO: 0.18 X10*3/UL (ref 0–0.7)
EOSINOPHIL NFR BLD AUTO: 2.6 %
ERYTHROCYTE [DISTWIDTH] IN BLOOD BY AUTOMATED COUNT: 13.9 % (ref 11.5–14.5)
GFR SERPL CREATININE-BSD FRML MDRD: >90 ML/MIN/1.73M*2
GLUCOSE SERPL-MCNC: 83 MG/DL (ref 74–99)
GLUCOSE UR STRIP.AUTO-MCNC: NEGATIVE MG/DL
HCT VFR BLD AUTO: 42.6 % (ref 36–46)
HDLC SERPL-MCNC: 51.6 MG/DL
HGB BLD-MCNC: 13.7 G/DL (ref 12–16)
IMM GRANULOCYTES # BLD AUTO: 0.01 X10*3/UL (ref 0–0.7)
IMM GRANULOCYTES NFR BLD AUTO: 0.1 % (ref 0–0.9)
KETONES UR STRIP.AUTO-MCNC: NEGATIVE MG/DL
LDLC SERPL CALC-MCNC: 154 MG/DL
LEUKOCYTE ESTERASE UR QL STRIP.AUTO: ABNORMAL
LYMPHOCYTES # BLD AUTO: 2.49 X10*3/UL (ref 1.2–4.8)
LYMPHOCYTES NFR BLD AUTO: 35.6 %
MCH RBC QN AUTO: 29.1 PG (ref 26–34)
MCHC RBC AUTO-ENTMCNC: 32.2 G/DL (ref 32–36)
MCV RBC AUTO: 90 FL (ref 80–100)
MONOCYTES # BLD AUTO: 0.56 X10*3/UL (ref 0.1–1)
MONOCYTES NFR BLD AUTO: 8 %
MUCOUS THREADS #/AREA URNS AUTO: ABNORMAL /LPF
NEUTROPHILS # BLD AUTO: 3.7 X10*3/UL (ref 1.2–7.7)
NEUTROPHILS NFR BLD AUTO: 53 %
NITRITE UR QL STRIP.AUTO: NEGATIVE
NON HDL CHOLESTEROL: 199 MG/DL (ref 0–149)
NRBC BLD-RTO: 0 /100 WBCS (ref 0–0)
PH UR STRIP.AUTO: 5 [PH]
PLATELET # BLD AUTO: 269 X10*3/UL (ref 150–450)
POTASSIUM SERPL-SCNC: 4.2 MMOL/L (ref 3.5–5.3)
PROT SERPL-MCNC: 6.1 G/DL (ref 6.4–8.2)
PROT UR STRIP.AUTO-MCNC: NEGATIVE MG/DL
RBC # BLD AUTO: 4.71 X10*6/UL (ref 4–5.2)
RBC # UR STRIP.AUTO: NEGATIVE /UL
RBC #/AREA URNS AUTO: ABNORMAL /HPF
SODIUM SERPL-SCNC: 143 MMOL/L (ref 136–145)
SP GR UR STRIP.AUTO: 1.02
SQUAMOUS #/AREA URNS AUTO: ABNORMAL /HPF
TRIGL SERPL-MCNC: 228 MG/DL (ref 0–149)
TSH SERPL-ACNC: 2.57 MIU/L (ref 0.44–3.98)
UROBILINOGEN UR STRIP.AUTO-MCNC: <2 MG/DL
VLDL: 46 MG/DL (ref 0–40)
WBC # BLD AUTO: 7 X10*3/UL (ref 4.4–11.3)
WBC #/AREA URNS AUTO: ABNORMAL /HPF

## 2023-12-28 PROCEDURE — 73560 X-RAY EXAM OF KNEE 1 OR 2: CPT | Mod: 50

## 2023-12-28 PROCEDURE — 86803 HEPATITIS C AB TEST: CPT

## 2023-12-28 PROCEDURE — 83036 HEMOGLOBIN GLYCOSYLATED A1C: CPT

## 2023-12-28 PROCEDURE — 82306 VITAMIN D 25 HYDROXY: CPT

## 2023-12-28 PROCEDURE — 84443 ASSAY THYROID STIM HORMONE: CPT

## 2023-12-28 PROCEDURE — 85025 COMPLETE CBC W/AUTO DIFF WBC: CPT

## 2023-12-28 PROCEDURE — 80307 DRUG TEST PRSMV CHEM ANLYZR: CPT

## 2023-12-28 PROCEDURE — 81001 URINALYSIS AUTO W/SCOPE: CPT

## 2023-12-28 PROCEDURE — 80053 COMPREHEN METABOLIC PANEL: CPT

## 2023-12-28 PROCEDURE — 80061 LIPID PANEL: CPT

## 2023-12-28 PROCEDURE — 73564 X-RAY EXAM KNEE 4 OR MORE: CPT | Mod: 50

## 2023-12-28 PROCEDURE — 87389 HIV-1 AG W/HIV-1&-2 AB AG IA: CPT

## 2023-12-28 PROCEDURE — 86038 ANTINUCLEAR ANTIBODIES: CPT

## 2023-12-28 PROCEDURE — 36415 COLL VENOUS BLD VENIPUNCTURE: CPT

## 2023-12-28 PROCEDURE — 73562 X-RAY EXAM OF KNEE 3: CPT | Mod: BILATERAL PROCEDURE | Performed by: RADIOLOGY

## 2023-12-29 ENCOUNTER — TELEMEDICINE (OUTPATIENT)
Dept: PRIMARY CARE | Facility: CLINIC | Age: 58
End: 2023-12-29
Payer: COMMERCIAL

## 2023-12-29 DIAGNOSIS — N30.00 ACUTE CYSTITIS WITHOUT HEMATURIA: Primary | ICD-10-CM

## 2023-12-29 DIAGNOSIS — J45.20 MILD INTERMITTENT ASTHMA WITHOUT COMPLICATION (HHS-HCC): ICD-10-CM

## 2023-12-29 LAB
25(OH)D3 SERPL-MCNC: 51 NG/ML (ref 30–100)
AMPHETAMINES UR QL SCN: NORMAL
ANA SER QL HEP2 SUBST: NEGATIVE
BARBITURATES UR QL SCN: NORMAL
BENZODIAZ UR QL SCN: NORMAL
BZE UR QL SCN: NORMAL
CANNABINOIDS UR QL SCN: NORMAL
EST. AVERAGE GLUCOSE BLD GHB EST-MCNC: 100 MG/DL
FENTANYL+NORFENTANYL UR QL SCN: NORMAL
HBA1C MFR BLD: 5.1 %
HCV AB SER QL: NONREACTIVE
HIV 1+2 AB+HIV1 P24 AG SERPL QL IA: NONREACTIVE
OPIATES UR QL SCN: NORMAL
OXYCODONE+OXYMORPHONE UR QL SCN: NORMAL
PCP UR QL SCN: NORMAL

## 2023-12-29 PROCEDURE — 99212 OFFICE O/P EST SF 10 MIN: CPT | Performed by: INTERNAL MEDICINE

## 2023-12-29 RX ORDER — DOXYCYCLINE 100 MG/1
100 CAPSULE ORAL 2 TIMES DAILY
Qty: 14 CAPSULE | Refills: 0 | Status: SHIPPED | OUTPATIENT
Start: 2023-12-29 | End: 2024-01-05

## 2023-12-29 ASSESSMENT — ENCOUNTER SYMPTOMS
FATIGUE: 0
NERVOUS/ANXIOUS: 0
EYE PAIN: 0
CONSTIPATION: 0
ARTHRALGIAS: 0
UNEXPECTED WEIGHT CHANGE: 0
SEIZURES: 0
ABDOMINAL PAIN: 0
NAUSEA: 0
FREQUENCY: 1
SPEECH DIFFICULTY: 0
SHORTNESS OF BREATH: 0
POLYDIPSIA: 0
FEVER: 0
DIZZINESS: 0
HALLUCINATIONS: 0
NECK PAIN: 0
DECREASED CONCENTRATION: 0
PHOTOPHOBIA: 0
FLANK PAIN: 0
COUGH: 0
APPETITE CHANGE: 0
SINUS PAIN: 0
BACK PAIN: 0
WOUND: 0
SLEEP DISTURBANCE: 0
JOINT SWELLING: 0
FACIAL ASYMMETRY: 0
SORE THROAT: 0
VOICE CHANGE: 0
VOMITING: 0
HEADACHES: 0
STRIDOR: 0
NUMBNESS: 0
BLOOD IN STOOL: 0
DIARRHEA: 0
POLYPHAGIA: 0
DYSURIA: 0
DYSPHORIC MOOD: 0
WHEEZING: 0
PALPITATIONS: 0
ADENOPATHY: 0
TROUBLE SWALLOWING: 0
ACTIVITY CHANGE: 0
WEAKNESS: 0
CONFUSION: 0
MYALGIAS: 0
COLOR CHANGE: 0
CHEST TIGHTNESS: 0
TREMORS: 0
HEMATURIA: 0

## 2023-12-29 NOTE — TELEPHONE ENCOUNTER
Pt called asking if she has an UTI she saw her labs and she is having sx with frequency, abd tenderness, some burning with urination.   ALLERGIES:   Ciprofloxacin  Unknown Not Specified Allergy 5/5/2023      Adverse Reactions/Drug Intolerances      Mold  Shortness of breath High Intolerance 6/3/2010

## 2023-12-29 NOTE — TELEPHONE ENCOUNTER
Make a virtual visit for Antibiotic. I can't prescribe antibiotic without talking. I can do it today.

## 2024-01-31 ENCOUNTER — APPOINTMENT (OUTPATIENT)
Dept: PRIMARY CARE | Facility: CLINIC | Age: 59
End: 2024-01-31
Payer: COMMERCIAL

## 2024-02-02 ENCOUNTER — OFFICE VISIT (OUTPATIENT)
Dept: PRIMARY CARE | Facility: CLINIC | Age: 59
End: 2024-02-02
Payer: COMMERCIAL

## 2024-02-02 VITALS
TEMPERATURE: 97 F | WEIGHT: 248 LBS | BODY MASS INDEX: 41.32 KG/M2 | HEART RATE: 71 BPM | SYSTOLIC BLOOD PRESSURE: 134 MMHG | DIASTOLIC BLOOD PRESSURE: 84 MMHG | HEIGHT: 65 IN | OXYGEN SATURATION: 99 %

## 2024-02-02 DIAGNOSIS — F51.01 PRIMARY INSOMNIA: Primary | ICD-10-CM

## 2024-02-02 DIAGNOSIS — J45.20 MILD INTERMITTENT ASTHMA WITHOUT COMPLICATION (HHS-HCC): ICD-10-CM

## 2024-02-02 PROCEDURE — 99213 OFFICE O/P EST LOW 20 MIN: CPT | Performed by: INTERNAL MEDICINE

## 2024-02-02 PROCEDURE — 1036F TOBACCO NON-USER: CPT | Performed by: INTERNAL MEDICINE

## 2024-02-02 NOTE — PROGRESS NOTES
"Subjective   Patient ID: Chelsie Lucio is a 58 y.o. female who presents for Follow-up and Med Refill.    HPI   Patient presented to the office for medicine refill.   She fell down few times as her knee give away. Luckily she did not hit herself in any fall.    Review of Systems   Constitutional:  Negative for activity change, appetite change, fatigue, fever and unexpected weight change.   HENT:  Negative for dental problem, ear discharge, hearing loss, nosebleeds, postnasal drip, sinus pain, sore throat, trouble swallowing and voice change.    Eyes:  Negative for photophobia, pain and visual disturbance.   Respiratory:  Negative for cough, chest tightness, shortness of breath, wheezing and stridor.    Cardiovascular:  Negative for chest pain, palpitations and leg swelling.   Gastrointestinal:  Negative for abdominal pain, blood in stool, constipation, diarrhea, nausea and vomiting.   Endocrine: Negative for polydipsia, polyphagia and polyuria.   Genitourinary:  Negative for decreased urine volume, dyspareunia, dysuria, flank pain, frequency, hematuria and urgency.   Musculoskeletal:  Positive for arthralgias, back pain and myalgias. Negative for gait problem, joint swelling and neck pain.   Skin:  Negative for color change, rash and wound.   Allergic/Immunologic: Negative for environmental allergies and food allergies.   Neurological:  Negative for dizziness, tremors, seizures, syncope, facial asymmetry, speech difficulty, weakness, numbness and headaches.   Hematological:  Negative for adenopathy.   Psychiatric/Behavioral:  Positive for sleep disturbance. Negative for behavioral problems, confusion, hallucinations, self-injury and suicidal ideas. The patient is not nervous/anxious.      Objective   /84   Pulse 71   Temp 36.1 °C (97 °F)   Ht 1.651 m (5' 5\")   Wt 112 kg (248 lb)   SpO2 99%   BMI 41.27 kg/m²     Physical Exam  Constitutional:       General: She is not in acute distress.     Appearance: " Normal appearance. She is obese. She is not ill-appearing or toxic-appearing.   HENT:      Head: Normocephalic and atraumatic.      Nose: Nose normal.   Eyes:      General:         Right eye: No discharge.         Left eye: No discharge.      Extraocular Movements: Extraocular movements intact.      Conjunctiva/sclera: Conjunctivae normal.      Pupils: Pupils are equal, round, and reactive to light.   Cardiovascular:      Rate and Rhythm: Normal rate and regular rhythm.      Pulses: Normal pulses.      Heart sounds: Normal heart sounds. No murmur heard.  Pulmonary:      Effort: Pulmonary effort is normal. No respiratory distress.      Breath sounds: Normal breath sounds. No stridor. No wheezing or rales.   Abdominal:      General: Bowel sounds are normal.      Tenderness: There is no abdominal tenderness. There is no right CVA tenderness or left CVA tenderness.   Musculoskeletal:         General: No swelling or deformity. Normal range of motion.      Cervical back: Normal range of motion and neck supple. No rigidity or tenderness.      Right lower leg: No edema.      Left lower leg: No edema.   Skin:     General: Skin is warm.      Coloration: Skin is not jaundiced.      Findings: No bruising, erythema or rash.   Neurological:      General: No focal deficit present.      Mental Status: She is alert and oriented to person, place, and time.      Cranial Nerves: No cranial nerve deficit.      Gait: Gait normal.   Psychiatric:         Mood and Affect: Mood normal.         Behavior: Behavior normal.         Thought Content: Thought content normal.         Judgment: Judgment normal.       Assessment/Plan   Problem List Items Addressed This Visit          Pulmonary and Pneumonias    Asthma    Relevant Medications    albuterol 90 mcg/actuation inhaler       Sleep    Insomnia - Primary    Relevant Medications    eszopiclone (Lunesta) 3 mg tablet

## 2024-02-03 RX ORDER — ALBUTEROL SULFATE 90 UG/1
2 AEROSOL, METERED RESPIRATORY (INHALATION) EVERY 6 HOURS PRN
Qty: 18 G | Refills: 3 | Status: SHIPPED | OUTPATIENT
Start: 2024-02-03

## 2024-02-03 RX ORDER — ESZOPICLONE 3 MG/1
3 TABLET, FILM COATED ORAL NIGHTLY
Qty: 90 TABLET | Refills: 0 | Status: SHIPPED | OUTPATIENT
Start: 2024-02-03 | End: 2024-05-01 | Stop reason: SDUPTHER

## 2024-02-06 ASSESSMENT — ENCOUNTER SYMPTOMS
VOICE CHANGE: 0
SLEEP DISTURBANCE: 1
CHEST TIGHTNESS: 0
NECK PAIN: 0
JOINT SWELLING: 0
WOUND: 0
APPETITE CHANGE: 0
BACK PAIN: 1
DIARRHEA: 0
NAUSEA: 0
PHOTOPHOBIA: 0
PALPITATIONS: 0
WEAKNESS: 0
CONSTIPATION: 0
FATIGUE: 0
WHEEZING: 0
NERVOUS/ANXIOUS: 0
NUMBNESS: 0
CONFUSION: 0
ACTIVITY CHANGE: 0
ABDOMINAL PAIN: 0
STRIDOR: 0
DYSURIA: 0
VOMITING: 0
SORE THROAT: 0
ARTHRALGIAS: 1
TREMORS: 0
COUGH: 0
HEMATURIA: 0
SPEECH DIFFICULTY: 0
EYE PAIN: 0
UNEXPECTED WEIGHT CHANGE: 0
SHORTNESS OF BREATH: 0
FREQUENCY: 0
SEIZURES: 0
POLYPHAGIA: 0
FLANK PAIN: 0
COLOR CHANGE: 0
POLYDIPSIA: 0
ADENOPATHY: 0
TROUBLE SWALLOWING: 0
DIZZINESS: 0
FEVER: 0
SINUS PAIN: 0
HALLUCINATIONS: 0
BLOOD IN STOOL: 0
FACIAL ASYMMETRY: 0
HEADACHES: 0
MYALGIAS: 1

## 2024-03-13 DIAGNOSIS — N89.8 VAGINAL DRYNESS: ICD-10-CM

## 2024-03-14 RX ORDER — ESTRADIOL 0.05 MG/D
FILM, EXTENDED RELEASE TRANSDERMAL
Qty: 24 PATCH | Refills: 3 | Status: SHIPPED | OUTPATIENT
Start: 2024-03-14

## 2024-04-20 NOTE — PROGRESS NOTES
Telemedicine visit is conducted with the patient with her consent about the medical problem as documented below.   Communication with the patient is face to face over the secured video call.    Subjective   Patient ID: Chelsie Lucio is a 58 y.o. female who presents for UTI (Frequency and urgency of urine.).    HPI   Patient called in office for the symptoms of UTI and she recently had UA and its positive for UTI.    Review of Systems   Constitutional:  Negative for activity change, appetite change, fatigue, fever and unexpected weight change.   HENT:  Negative for dental problem, ear discharge, hearing loss, nosebleeds, postnasal drip, sinus pain, sore throat, trouble swallowing and voice change.    Eyes:  Negative for photophobia, pain and visual disturbance.   Respiratory:  Negative for cough, chest tightness, shortness of breath, wheezing and stridor.    Cardiovascular:  Negative for chest pain, palpitations and leg swelling.   Gastrointestinal:  Negative for abdominal pain, blood in stool, constipation, diarrhea, nausea and vomiting.   Endocrine: Negative for polydipsia, polyphagia and polyuria.   Genitourinary:  Positive for frequency and urgency. Negative for decreased urine volume, dyspareunia, dysuria, flank pain and hematuria.   Musculoskeletal:  Negative for arthralgias, back pain, gait problem, joint swelling, myalgias and neck pain.   Skin:  Negative for color change, rash and wound.   Allergic/Immunologic: Negative for environmental allergies and food allergies.   Neurological:  Negative for dizziness, tremors, seizures, syncope, facial asymmetry, speech difficulty, weakness, numbness and headaches.   Hematological:  Negative for adenopathy.   Psychiatric/Behavioral:  Negative for behavioral problems, confusion, decreased concentration, dysphoric mood, hallucinations, self-injury, sleep disturbance and suicidal ideas. The patient is not nervous/anxious.      Objective   There were no vitals taken for  this visit.    Physical Exam  Virtual Physical Exam  Awake alert and oriented x 3. Following directions and replying to all questions appropriately.  No use of accessory muscle of respiration. No acute respiratory distress.  Moving all extremities.  Clear bilateral conjunctiva.  No visible skin rash over the face and extremities.  Joints movements of UE and bilateral knee is normal.  Normal mood with appropriate effect and emotions.    Rest of the physical exam is limited due to virtual nature of this visit.    Assessment/Plan   Problem List Items Addressed This Visit          Pulmonary and Pneumonias    Asthma     Stable for now.          Other Visit Diagnoses       Acute cystitis without hematuria    -  Primary    Relevant Medications    doxycycline (Vibramycin) 100 mg capsule           3

## 2024-05-01 ENCOUNTER — TELEMEDICINE (OUTPATIENT)
Dept: PRIMARY CARE | Facility: CLINIC | Age: 59
End: 2024-05-01
Payer: COMMERCIAL

## 2024-05-01 DIAGNOSIS — M54.6 BACK PAIN OF THORACOLUMBAR REGION: ICD-10-CM

## 2024-05-01 DIAGNOSIS — F51.01 PRIMARY INSOMNIA: Primary | ICD-10-CM

## 2024-05-01 DIAGNOSIS — Z12.11 COLON CANCER SCREENING: ICD-10-CM

## 2024-05-01 DIAGNOSIS — Z12.31 BREAST CANCER SCREENING BY MAMMOGRAM: ICD-10-CM

## 2024-05-01 DIAGNOSIS — M54.50 BACK PAIN OF THORACOLUMBAR REGION: ICD-10-CM

## 2024-05-01 DIAGNOSIS — Z78.0 ASYMPTOMATIC MENOPAUSAL STATE: ICD-10-CM

## 2024-05-01 DIAGNOSIS — M54.6 MIDLINE THORACIC BACK PAIN, UNSPECIFIED CHRONICITY: ICD-10-CM

## 2024-05-01 PROCEDURE — 1036F TOBACCO NON-USER: CPT | Performed by: INTERNAL MEDICINE

## 2024-05-01 PROCEDURE — 99214 OFFICE O/P EST MOD 30 MIN: CPT | Performed by: INTERNAL MEDICINE

## 2024-05-01 RX ORDER — ESZOPICLONE 3 MG/1
3 TABLET, FILM COATED ORAL NIGHTLY
Qty: 90 TABLET | Refills: 0 | Status: SHIPPED | OUTPATIENT
Start: 2024-05-01

## 2024-05-01 ASSESSMENT — ENCOUNTER SYMPTOMS
WEAKNESS: 0
SHORTNESS OF BREATH: 0
JOINT SWELLING: 0
VOMITING: 0
MYALGIAS: 0
DIARRHEA: 0
TREMORS: 0
COUGH: 0
HEMATURIA: 0
BLOOD IN STOOL: 0
NERVOUS/ANXIOUS: 1
CONFUSION: 0
HEADACHES: 0
DECREASED CONCENTRATION: 0
ARTHRALGIAS: 1
SLEEP DISTURBANCE: 0
FREQUENCY: 0
EYE PAIN: 0
CONSTIPATION: 0
POLYDIPSIA: 0
TROUBLE SWALLOWING: 0
FLANK PAIN: 0
ADENOPATHY: 0
APPETITE CHANGE: 0
POLYPHAGIA: 0
NECK PAIN: 0
ACTIVITY CHANGE: 0
PALPITATIONS: 0
UNEXPECTED WEIGHT CHANGE: 0
SINUS PAIN: 0
BACK PAIN: 1
HALLUCINATIONS: 0
WOUND: 0
VOICE CHANGE: 0
COLOR CHANGE: 0
FATIGUE: 0
FACIAL ASYMMETRY: 0
CHEST TIGHTNESS: 0
STRIDOR: 0
FEVER: 0
ABDOMINAL PAIN: 0
PHOTOPHOBIA: 0
NUMBNESS: 0
DIZZINESS: 0
SPEECH DIFFICULTY: 0
SORE THROAT: 0
SEIZURES: 0
DYSURIA: 0
NAUSEA: 0
WHEEZING: 0

## 2024-05-01 NOTE — PATIENT INSTRUCTIONS

## 2024-05-01 NOTE — PROGRESS NOTES
Telemedicine visit is conducted with the patient with her consent about the medical problem as documented below.   Communication with the patient is face to face over the secured video call.    Subjective   Patient ID: Chelsie Lucio is a 59 y.o. female who presents for Follow-up.    HPI   Patient need refill but she has other problems    She has back pain and few falls experienced from last visit and want to get evaluated with x ray of back.  She also has on and off chest pain shooting to the back under right breast which mostly happens when tries to roll over in the bed.    OARRS:  No data recorded  I have personally reviewed the OARRS report for Chelsie Lucio. I have considered the risks of abuse, dependence, addiction and diversion and I believe that it is clinically appropriate for Chelsie Lucio to be prescribed this medication    Is the patient prescribed a combination of a benzodiazepine and opioid?  No    Last Urine Drug Screen / ordered today: No  Recent Results (from the past 8760 hour(s))   Drug Screen, Urine With Reflex to Confirmation    Collection Time: 12/28/23 10:21 AM   Result Value Ref Range    Amphetamine Screen, Urine Presumptive Negative Presumptive Negative    Barbiturate Screen, Urine Presumptive Negative Presumptive Negative    Benzodiazepines Screen, Urine Presumptive Negative Presumptive Negative    Cannabinoid Screen, Urine Presumptive Negative Presumptive Negative    Cocaine Metabolite Screen, Urine Presumptive Negative Presumptive Negative    Fentanyl Screen, Urine Presumptive Negative Presumptive Negative    Opiate Screen, Urine Presumptive Negative Presumptive Negative    Oxycodone Screen, Urine Presumptive Negative Presumptive Negative    PCP Screen, Urine Presumptive Negative Presumptive Negative     Results are as expected.     Controlled Substance Agreement:  Date of the Last Agreement: 08/04/2023  Reviewed Controlled Substance Agreement including but not limited to the benefits,  risks, and alternatives to treatment with a Controlled Substance medication(s).    Sleep Aids:   What is the patient's goal of therapy? To sleep better  Is this being achieved with current treatment? Yes    Activities of Daily Living:   Is your overall impression that this patient is benefiting (symptom reduction outweighs side effects) from sleep aid therapy? Yes     1. Physical Functioning: Better  2. Family Relationship: Same  3. Social Relationship: Same  4. Mood: Better  5. Sleep Patterns: Better  6. Overall Function: Better    Review of Systems   Constitutional:  Negative for activity change, appetite change, fatigue, fever and unexpected weight change.   HENT:  Negative for dental problem, ear discharge, hearing loss, nosebleeds, postnasal drip, sinus pain, sore throat, trouble swallowing and voice change.    Eyes:  Negative for photophobia, pain and visual disturbance.   Respiratory:  Negative for cough, chest tightness, shortness of breath, wheezing and stridor.    Cardiovascular:  Negative for chest pain, palpitations and leg swelling.   Gastrointestinal:  Negative for abdominal pain, blood in stool, constipation, diarrhea, nausea and vomiting.   Endocrine: Negative for polydipsia, polyphagia and polyuria.   Genitourinary:  Negative for decreased urine volume, dyspareunia, dysuria, flank pain, frequency, hematuria and urgency.   Musculoskeletal:  Positive for arthralgias and back pain. Negative for gait problem, joint swelling, myalgias and neck pain.   Skin:  Negative for color change, rash and wound.   Allergic/Immunologic: Negative for environmental allergies and food allergies.   Neurological:  Negative for dizziness, tremors, seizures, syncope, facial asymmetry, speech difficulty, weakness, numbness and headaches.   Hematological:  Negative for adenopathy.   Psychiatric/Behavioral:  Negative for behavioral problems, confusion, decreased concentration, hallucinations, self-injury, sleep disturbance and  suicidal ideas. The patient is nervous/anxious.      Objective   There were no vitals taken for this visit.    Physical Exam  Virtual Physical Exam  Awake alert and oriented x 3. Following directions and replying to all questions appropriately.  No use of accessory muscle of respiration. No acute respiratory distress.  Moving all extremities.  Clear bilateral conjunctiva.  No visible skin rash over the face and extremities.  Joints movements of UE and bilateral knee is normal.  Normal mood with appropriate effect and emotions.    Rest of the physical exam is limited due to virtual nature of this visit.    Assessment/Plan   Problem List Items Addressed This Visit          Musculoskeletal and Injuries    Back pain    Relevant Orders    XR thoracic spine 3 views       Sleep    Primary insomnia - Primary    Relevant Medications    eszopiclone (Lunesta) 3 mg tablet     Other Visit Diagnoses       Back pain of thoracolumbar region        Relevant Orders    XR lumbar spine 2-3 views    Colon cancer screening        Relevant Orders    Cologuard® colon cancer screening    Breast cancer screening by mammogram        Relevant Orders    BI mammo bilateral screening tomosynthesis    Asymptomatic menopausal state        Relevant Orders    XR DEXA bone density          Patient was identified as a fall risk. Risk prevention instructions provided.    RTC in 3 months.

## 2024-05-13 ENCOUNTER — TELEPHONE (OUTPATIENT)
Dept: PRIMARY CARE | Facility: CLINIC | Age: 59
End: 2024-05-13

## 2024-05-13 ENCOUNTER — TELEMEDICINE (OUTPATIENT)
Dept: PRIMARY CARE | Facility: CLINIC | Age: 59
End: 2024-05-13
Payer: COMMERCIAL

## 2024-05-13 DIAGNOSIS — J20.9 ACUTE BRONCHITIS, UNSPECIFIED ORGANISM: Primary | ICD-10-CM

## 2024-05-13 PROCEDURE — 99213 OFFICE O/P EST LOW 20 MIN: CPT | Performed by: INTERNAL MEDICINE

## 2024-05-13 RX ORDER — PREDNISONE 20 MG/1
40 TABLET ORAL DAILY
Qty: 14 TABLET | Refills: 0 | Status: SHIPPED | OUTPATIENT
Start: 2024-05-13 | End: 2024-05-20

## 2024-05-13 RX ORDER — AZITHROMYCIN 250 MG/1
TABLET, FILM COATED ORAL DAILY
Qty: 6 TABLET | Refills: 0 | Status: SHIPPED | OUTPATIENT
Start: 2024-05-13 | End: 2024-05-18

## 2024-05-13 ASSESSMENT — ENCOUNTER SYMPTOMS
POLYDIPSIA: 0
NUMBNESS: 0
DIZZINESS: 0
ARTHRALGIAS: 0
HEMATURIA: 0
RHINORRHEA: 1
HEADACHES: 0
BLOOD IN STOOL: 0
FATIGUE: 1
HALLUCINATIONS: 0
FLANK PAIN: 0
SHORTNESS OF BREATH: 1
CONSTIPATION: 0
APPETITE CHANGE: 0
NAUSEA: 0
NECK PAIN: 0
WOUND: 0
PALPITATIONS: 0
COLOR CHANGE: 0
STRIDOR: 0
EYE PAIN: 0
ACTIVITY CHANGE: 0
COUGH: 1
VOMITING: 0
CHEST TIGHTNESS: 0
SEIZURES: 0
SORE THROAT: 0
POLYPHAGIA: 0
VOICE CHANGE: 0
TROUBLE SWALLOWING: 0
DIARRHEA: 0
BACK PAIN: 0
PHOTOPHOBIA: 0
SLEEP DISTURBANCE: 0
SPEECH DIFFICULTY: 0
ADENOPATHY: 0
JOINT SWELLING: 0
WHEEZING: 1
WEAKNESS: 0
UNEXPECTED WEIGHT CHANGE: 0
SINUS PAIN: 0
FEVER: 0
DYSURIA: 0
FACIAL ASYMMETRY: 0
CONFUSION: 0
TREMORS: 0
NERVOUS/ANXIOUS: 0
MYALGIAS: 0
ABDOMINAL PAIN: 0

## 2024-05-13 NOTE — PROGRESS NOTES
Telemedicine visit is conducted with the patient with his/ her consent about the medical problem as documented below.   Communication with the patient is face to face over the secured video call.    Subjective   Patient ID: Chelsie Lucio is a 59 y.o. female who presents for Bronchitis.    HPI   Patient has symptoms of Acute bronchitis and and she was seen in Urgent care. She was given Fluticasone but cough is still present. She gets shortness of breath and wheezing as well.    Review of Systems   Constitutional:  Positive for fatigue. Negative for activity change, appetite change, fever and unexpected weight change.   HENT:  Positive for congestion and rhinorrhea. Negative for dental problem, ear discharge, hearing loss, nosebleeds, postnasal drip, sinus pain, sore throat, trouble swallowing and voice change.    Eyes:  Negative for photophobia, pain and visual disturbance.   Respiratory:  Positive for cough, shortness of breath and wheezing. Negative for chest tightness and stridor.    Cardiovascular:  Negative for chest pain, palpitations and leg swelling.   Gastrointestinal:  Negative for abdominal pain, blood in stool, constipation, diarrhea, nausea and vomiting.   Endocrine: Negative for polydipsia, polyphagia and polyuria.   Genitourinary:  Negative for decreased urine volume, dyspareunia, dysuria, flank pain, hematuria and urgency.   Musculoskeletal:  Negative for arthralgias, back pain, gait problem, joint swelling, myalgias and neck pain.   Skin:  Negative for color change, rash and wound.   Allergic/Immunologic: Negative for environmental allergies and food allergies.   Neurological:  Negative for dizziness, tremors, seizures, syncope, facial asymmetry, speech difficulty, weakness, numbness and headaches.   Hematological:  Negative for adenopathy.   Psychiatric/Behavioral:  Negative for behavioral problems, confusion, hallucinations, self-injury, sleep disturbance and suicidal ideas. The patient is not  nervous/anxious.      Objective   There were no vitals taken for this visit.    Physical Exam  Virtual Physical Exam  Awake alert and oriented x 3. Following directions and replying to all questions appropriately.  Rhinorrhea is present. Patient is coughing intermittently.  No use of accessory muscle of respiration. No acute respiratory distress.  Moving all extremities.  No visible skin rash over the face and extremities.  Joints movements of UE and bilateral knee is normal.  Normal mood with appropriate effect and emotions.    Rest of the physical exam is limited due to virtual nature of this visit.    Assessment/Plan   Problem List Items Addressed This Visit    None  Visit Diagnoses       Acute bronchitis, unspecified organism    -  Primary    Relevant Medications    azithromycin (Zithromax) 250 mg tablet    predniSONE (Deltasone) 20 mg tablet

## 2024-05-13 NOTE — TELEPHONE ENCOUNTER
Pt called stating she went to urgent care and was diagnosed with bronchitis. Was given a steroid inhaler with directions to do 2 puffs a day, every 12 hours. Pt stated every time she puff she cough really bad and is not feeling any better. Pt prefers to do virtual because she's an hour a way  but wants to know if you need to see her in person?

## 2024-05-16 ENCOUNTER — TELEMEDICINE (OUTPATIENT)
Dept: PRIMARY CARE | Facility: CLINIC | Age: 59
End: 2024-05-16
Payer: COMMERCIAL

## 2024-05-16 DIAGNOSIS — J30.9 ALLERGIC RHINITIS, UNSPECIFIED SEASONALITY, UNSPECIFIED TRIGGER: ICD-10-CM

## 2024-05-16 DIAGNOSIS — J20.9 ACUTE BRONCHITIS, UNSPECIFIED ORGANISM: Primary | ICD-10-CM

## 2024-05-16 PROCEDURE — 99213 OFFICE O/P EST LOW 20 MIN: CPT | Performed by: INTERNAL MEDICINE

## 2024-05-16 RX ORDER — IPRATROPIUM BROMIDE AND ALBUTEROL SULFATE 2.5; .5 MG/3ML; MG/3ML
3 SOLUTION RESPIRATORY (INHALATION) 4 TIMES DAILY PRN
Qty: 90 ML | Refills: 11 | Status: SHIPPED | OUTPATIENT
Start: 2024-05-16 | End: 2024-08-14

## 2024-05-16 RX ORDER — FLUTICASONE PROPIONATE 50 MCG
2 SPRAY, SUSPENSION (ML) NASAL DAILY
Qty: 48 G | Refills: 2 | Status: SHIPPED | OUTPATIENT
Start: 2024-05-16 | End: 2024-06-15

## 2024-05-16 RX ORDER — BENZONATATE 200 MG/1
200 CAPSULE ORAL 3 TIMES DAILY PRN
Qty: 42 CAPSULE | Refills: 0 | Status: SHIPPED | OUTPATIENT
Start: 2024-05-16 | End: 2024-06-15

## 2024-05-16 ASSESSMENT — ENCOUNTER SYMPTOMS
NUMBNESS: 0
PHOTOPHOBIA: 0
STRIDOR: 0
SHORTNESS OF BREATH: 1
PALPITATIONS: 0
SINUS PAIN: 0
CHEST TIGHTNESS: 0
NAUSEA: 0
BACK PAIN: 0
CONSTIPATION: 0
DYSURIA: 0
FLANK PAIN: 0
ARTHRALGIAS: 0
SPEECH DIFFICULTY: 0
POLYDIPSIA: 0
SORE THROAT: 0
POLYPHAGIA: 0
SLEEP DISTURBANCE: 0
FEVER: 0
DIARRHEA: 0
NECK PAIN: 0
HALLUCINATIONS: 0
COUGH: 1
ACTIVITY CHANGE: 0
JOINT SWELLING: 0
DIZZINESS: 0
TREMORS: 0
NERVOUS/ANXIOUS: 0
VOMITING: 0
CONFUSION: 0
ADENOPATHY: 0
TROUBLE SWALLOWING: 0
UNEXPECTED WEIGHT CHANGE: 0
MYALGIAS: 1
WEAKNESS: 0
HEMATURIA: 0
VOICE CHANGE: 0
WHEEZING: 0
EYE PAIN: 0
COLOR CHANGE: 0
FATIGUE: 1
HEADACHES: 0
ABDOMINAL PAIN: 0
WOUND: 0
BLOOD IN STOOL: 0
FACIAL ASYMMETRY: 0
APPETITE CHANGE: 0
SEIZURES: 0

## 2024-05-16 NOTE — PROGRESS NOTES
Telemedicine visit is conducted with the patient with her consent about the medical problem as documented below.   Communication with the patient is face to face over the secured video call.    Subjective   Patient ID: Chelsie Lucio is a 59 y.o. female who presents for Bronchitis.    HPI   Patient was given Azithromycin and Prednisone couple of days ago for Acute bronchitis. Last night she went to ER as she was feeling more short of breath. Chest x ray in the ER was negative for pneumonia.  She was given Duo Neb breathing treatment in the ER that improved her symptoms. She has a nebulizer machine and called today to get the medication.    Patient also want refill on Tessalon and Flonase.    Review of Systems   Constitutional:  Positive for fatigue. Negative for activity change, appetite change, fever and unexpected weight change.   HENT:  Negative for dental problem, ear discharge, hearing loss, nosebleeds, postnasal drip, sinus pain, sore throat, trouble swallowing and voice change.    Eyes:  Negative for photophobia, pain and visual disturbance.   Respiratory:  Positive for cough and shortness of breath. Negative for chest tightness, wheezing and stridor.    Cardiovascular:  Negative for chest pain, palpitations and leg swelling.   Gastrointestinal:  Negative for abdominal pain, blood in stool, constipation, diarrhea, nausea and vomiting.   Endocrine: Negative for polydipsia, polyphagia and polyuria.   Genitourinary:  Negative for decreased urine volume, dyspareunia, dysuria, flank pain, hematuria and urgency.   Musculoskeletal:  Positive for myalgias. Negative for arthralgias, back pain, gait problem, joint swelling and neck pain.   Skin:  Negative for color change, rash and wound.   Allergic/Immunologic: Negative for environmental allergies and food allergies.   Neurological:  Negative for dizziness, tremors, seizures, syncope, facial asymmetry, speech difficulty, weakness, numbness and headaches.    Hematological:  Negative for adenopathy.   Psychiatric/Behavioral:  Negative for behavioral problems, confusion, hallucinations, self-injury, sleep disturbance and suicidal ideas. The patient is not nervous/anxious.      Objective   There were no vitals taken for this visit.    Physical Exam  Patient has occasional cough but compared to the video call I did couple of days ago and today she is looking better.   There is a sharp difference noted.    Voice is less congested today. No rhinorrhea. Cough last time was continuous but this time I noticed only one time.   Her mucous membrane do not look dry like last visit and overall she is appearing better but not normal.    Assessment/Plan   Problem List Items Addressed This Visit    None  Visit Diagnoses       Acute bronchitis, unspecified organism    -  Primary    Relevant Medications    ipratropium-albuteroL (Duo-Neb) 0.5-2.5 mg/3 mL nebulizer solution    benzonatate (Tessalon) 200 mg capsule    Allergic rhinitis, unspecified seasonality, unspecified trigger        Relevant Medications    fluticasone (Flonase) 50 mcg/actuation nasal spray        Continue the remaining course of Azithromycin and Prednisone.

## 2024-05-24 ENCOUNTER — TELEPHONE (OUTPATIENT)
Dept: PRIMARY CARE | Facility: CLINIC | Age: 59
End: 2024-05-24
Payer: COMMERCIAL

## 2024-05-24 DIAGNOSIS — J20.9 ACUTE BRONCHITIS, UNSPECIFIED ORGANISM: Primary | ICD-10-CM

## 2024-05-24 RX ORDER — DOXYCYCLINE 100 MG/1
100 CAPSULE ORAL 2 TIMES DAILY
Qty: 14 CAPSULE | Refills: 0 | Status: SHIPPED | OUTPATIENT
Start: 2024-05-24 | End: 2024-05-31

## 2024-05-24 NOTE — TELEPHONE ENCOUNTER
Pt called asking if you can call in another round of antibiotics she is still feeling bad her cough is very productive and some sob she said this is going on 3 weeks and she has already seen you for this. Please advise

## 2024-09-01 DIAGNOSIS — K21.9 GASTROESOPHAGEAL REFLUX DISEASE WITHOUT ESOPHAGITIS: ICD-10-CM

## 2024-09-01 DIAGNOSIS — R06.02 SHORTNESS OF BREATH: ICD-10-CM

## 2024-09-04 DIAGNOSIS — F51.01 PRIMARY INSOMNIA: ICD-10-CM

## 2024-09-04 RX ORDER — OMEPRAZOLE 40 MG/1
40 CAPSULE, DELAYED RELEASE ORAL DAILY
Qty: 90 CAPSULE | Refills: 1 | Status: SHIPPED | OUTPATIENT
Start: 2024-09-04

## 2024-09-04 RX ORDER — MONTELUKAST SODIUM 10 MG/1
10 TABLET ORAL DAILY
Qty: 90 TABLET | Refills: 1 | Status: SHIPPED | OUTPATIENT
Start: 2024-09-04

## 2024-09-04 RX ORDER — ESZOPICLONE 3 MG/1
3 TABLET, FILM COATED ORAL NIGHTLY
Qty: 90 TABLET | Refills: 0 | Status: SHIPPED | OUTPATIENT
Start: 2024-09-04

## 2024-09-04 RX ORDER — MELOXICAM 15 MG/1
TABLET ORAL
Qty: 90 TABLET | Refills: 1 | Status: SHIPPED | OUTPATIENT
Start: 2024-09-04

## 2024-09-23 LAB — NONINV COLON CA DNA+OCC BLD SCRN STL QL: NEGATIVE

## 2024-10-07 ENCOUNTER — OFFICE VISIT (OUTPATIENT)
Dept: PRIMARY CARE | Facility: CLINIC | Age: 59
End: 2024-10-07
Payer: COMMERCIAL

## 2024-10-07 VITALS — SYSTOLIC BLOOD PRESSURE: 142 MMHG | DIASTOLIC BLOOD PRESSURE: 98 MMHG

## 2024-10-07 DIAGNOSIS — F41.9 ANXIETY: ICD-10-CM

## 2024-10-07 DIAGNOSIS — R00.2 PALPITATIONS: Primary | ICD-10-CM

## 2024-10-07 PROCEDURE — 1036F TOBACCO NON-USER: CPT | Performed by: INTERNAL MEDICINE

## 2024-10-07 PROCEDURE — 99214 OFFICE O/P EST MOD 30 MIN: CPT | Performed by: INTERNAL MEDICINE

## 2024-10-07 RX ORDER — METOPROLOL SUCCINATE 25 MG/1
25 TABLET, EXTENDED RELEASE ORAL DAILY
Qty: 30 TABLET | Refills: 11 | Status: SHIPPED | OUTPATIENT
Start: 2024-10-07 | End: 2025-10-07

## 2024-10-07 RX ORDER — LORAZEPAM 0.5 MG/1
0.5 TABLET ORAL 2 TIMES DAILY PRN
Qty: 28 TABLET | Refills: 0 | Status: SHIPPED | OUTPATIENT
Start: 2024-10-07 | End: 2024-10-21

## 2024-10-07 ASSESSMENT — ENCOUNTER SYMPTOMS
VOMITING: 0
WEAKNESS: 1
BLOOD IN STOOL: 0
DIARRHEA: 0
UNEXPECTED WEIGHT CHANGE: 0
POLYDIPSIA: 0
SHORTNESS OF BREATH: 1
FEVER: 0
FLANK PAIN: 0
DIZZINESS: 0
CONSTIPATION: 0
ACTIVITY CHANGE: 0
NAUSEA: 0
HALLUCINATIONS: 0
HEADACHES: 0
SPEECH DIFFICULTY: 0
VOICE CHANGE: 0
DYSURIA: 0
COLOR CHANGE: 0
MYALGIAS: 0
ARTHRALGIAS: 0
CHEST TIGHTNESS: 0
POLYPHAGIA: 0
STRIDOR: 0
TROUBLE SWALLOWING: 0
ADENOPATHY: 0
NERVOUS/ANXIOUS: 1
BACK PAIN: 0
TREMORS: 0
FACIAL ASYMMETRY: 0
APPETITE CHANGE: 0
ABDOMINAL PAIN: 0
FATIGUE: 1
SLEEP DISTURBANCE: 0
WHEEZING: 0
PALPITATIONS: 1
CONFUSION: 0
SINUS PAIN: 0
HEMATURIA: 0
COUGH: 0
SORE THROAT: 0
EYE PAIN: 0
PHOTOPHOBIA: 0
WOUND: 0
SEIZURES: 0
NECK PAIN: 0
NUMBNESS: 0
JOINT SWELLING: 0

## 2024-10-07 ASSESSMENT — PATIENT HEALTH QUESTIONNAIRE - PHQ9
1. LITTLE INTEREST OR PLEASURE IN DOING THINGS: NEARLY EVERY DAY
2. FEELING DOWN, DEPRESSED OR HOPELESS: NEARLY EVERY DAY
SUM OF ALL RESPONSES TO PHQ9 QUESTIONS 1 AND 2: 6

## 2024-10-07 ASSESSMENT — PAIN SCALES - GENERAL: PAINLEVEL: 6

## 2024-10-07 NOTE — PROGRESS NOTES
Subjective   Patient ID: Chelsie Lucio is a 59 y.o. female who presents for Follow-up (10/4/24 pt was seen in ED for fatigued ,anxiety has questions and concerns).    HPI   Patient presented to the office after recent ER visit following an attack of anxiety and palpitations. She had to take Lorazepam before going to ER. Her watch showed HR of 170. She was feeling panic sensation along with mild chest discomfort. In the ER initial EKG showed possible atrial flutter but then it improved. Patient was asked to contact PCP for holter monitor.     On and off chest discomfort with palpitations.  She use albuterol only once in few days.    Her BP is also high today at 140/98.    Review of Systems   Constitutional:  Positive for fatigue. Negative for activity change, appetite change, fever and unexpected weight change.   HENT:  Negative for dental problem, ear discharge, hearing loss, nosebleeds, postnasal drip, sinus pain, sore throat, trouble swallowing and voice change.    Eyes:  Negative for photophobia, pain and visual disturbance.   Respiratory:  Positive for shortness of breath. Negative for cough, chest tightness, wheezing and stridor.    Cardiovascular:  Positive for palpitations. Negative for chest pain and leg swelling.   Gastrointestinal:  Negative for abdominal pain, blood in stool, constipation, diarrhea, nausea and vomiting.   Endocrine: Negative for polydipsia, polyphagia and polyuria.   Genitourinary:  Negative for decreased urine volume, dyspareunia, dysuria, flank pain, hematuria and urgency.   Musculoskeletal:  Negative for arthralgias, back pain, gait problem, joint swelling, myalgias and neck pain.   Skin:  Negative for color change, rash and wound.   Allergic/Immunologic: Negative for environmental allergies and food allergies.   Neurological:  Positive for weakness. Negative for dizziness, tremors, seizures, syncope, facial asymmetry, speech difficulty, numbness and headaches.   Hematological:   Negative for adenopathy.   Psychiatric/Behavioral:  Negative for behavioral problems, confusion, hallucinations, self-injury, sleep disturbance and suicidal ideas. The patient is nervous/anxious.      Objective   BP (!) 142/98     Physical Exam  Vitals and nursing note reviewed.   Constitutional:       General: She is not in acute distress.     Appearance: Normal appearance. She is obese. She is not ill-appearing or toxic-appearing.   HENT:      Head: Normocephalic and atraumatic.      Nose: Nose normal.   Eyes:      Extraocular Movements: Extraocular movements intact.      Conjunctiva/sclera: Conjunctivae normal.      Pupils: Pupils are equal, round, and reactive to light.   Cardiovascular:      Rate and Rhythm: Normal rate and regular rhythm.      Pulses: Normal pulses.      Heart sounds: Normal heart sounds. No murmur heard.     No gallop.   Pulmonary:      Effort: Pulmonary effort is normal. No respiratory distress.      Breath sounds: Normal breath sounds. No stridor. No wheezing, rhonchi or rales.   Musculoskeletal:         General: No swelling or deformity. Normal range of motion.      Cervical back: Normal range of motion and neck supple. No rigidity or tenderness.      Right lower leg: No edema.      Left lower leg: No edema.   Skin:     General: Skin is warm.      Coloration: Skin is not jaundiced.      Findings: No erythema or rash.   Neurological:      General: No focal deficit present.      Mental Status: She is alert and oriented to person, place, and time.      Cranial Nerves: No cranial nerve deficit.      Gait: Gait normal.   Psychiatric:         Mood and Affect: Mood normal.         Behavior: Behavior normal.       Assessment/Plan   Problem List Items Addressed This Visit       Anxiety    Relevant Medications    LORazepam (Ativan) 0.5 mg tablet    Palpitations - Primary    Relevant Medications    metoprolol succinate XL (Toprol-XL) 25 mg 24 hr tablet    LORazepam (Ativan) 0.5 mg tablet    Other  Relevant Orders    Holter or Event Cardiac Monitor    Transthoracic Echo (TTE) Complete

## 2024-10-11 ENCOUNTER — TELEPHONE (OUTPATIENT)
Dept: PRIMARY CARE | Facility: CLINIC | Age: 59
End: 2024-10-11
Payer: COMMERCIAL

## 2024-10-11 NOTE — TELEPHONE ENCOUNTER
Pt called in asking can dental problems be added to her visit when she seen Dr. Mcclain 10/7/24. I stated that we can not  edit doctor visits being that she was not seen for that problem for dental .Also she asked can she take zyrtec D with her current medication.

## 2024-10-18 ENCOUNTER — HOSPITAL ENCOUNTER (OUTPATIENT)
Dept: CARDIOLOGY | Facility: HOSPITAL | Age: 59
Discharge: HOME | End: 2024-10-18
Payer: COMMERCIAL

## 2024-10-18 DIAGNOSIS — R00.2 PALPITATIONS: ICD-10-CM

## 2024-10-18 DIAGNOSIS — R94.31 ABNORMAL ELECTROCARDIOGRAM (ECG) (EKG): ICD-10-CM

## 2024-10-18 PROCEDURE — 93306 TTE W/DOPPLER COMPLETE: CPT | Performed by: INTERNAL MEDICINE

## 2024-10-18 PROCEDURE — 93306 TTE W/DOPPLER COMPLETE: CPT

## 2024-10-18 PROCEDURE — 93246 EXT ECG>7D<15D RECORDING: CPT

## 2024-10-20 LAB
AORTIC VALVE PEAK VELOCITY: 1.39 M/S
AV PEAK GRADIENT: 7.7 MMHG
AVA (PEAK VEL): 2.34 CM2
EJECTION FRACTION APICAL 4 CHAMBER: 67.4
EJECTION FRACTION: 63 %
LEFT ATRIUM VOLUME AREA LENGTH INDEX BSA: 24.6 ML/M2
LEFT VENTRICLE INTERNAL DIMENSION DIASTOLE: 3.79 CM (ref 3.5–6)
LEFT VENTRICULAR OUTFLOW TRACT DIAMETER: 2.1 CM
MITRAL VALVE E/A RATIO: 1.77
RIGHT VENTRICLE FREE WALL PEAK S': 13.6 CM/S
TRICUSPID ANNULAR PLANE SYSTOLIC EXCURSION: 2.3 CM

## 2024-10-25 ENCOUNTER — TELEPHONE (OUTPATIENT)
Dept: PRIMARY CARE | Facility: CLINIC | Age: 59
End: 2024-10-25
Payer: COMMERCIAL

## 2024-10-30 DIAGNOSIS — R00.2 PALPITATIONS: ICD-10-CM

## 2024-11-04 ENCOUNTER — TELEPHONE (OUTPATIENT)
Dept: PRIMARY CARE | Facility: CLINIC | Age: 59
End: 2024-11-04
Payer: COMMERCIAL

## 2024-11-04 NOTE — TELEPHONE ENCOUNTER
Called to inform pt. Pt stated her concern is that the dosage is too much for her she is wanting to try a lower dosage. Stated that since she's been on the metoprolol she has been dizzy and fatigue. I offered pt to come in but she stated she stats in [place] and can't just drive in. Are you willing to lower dosage or do you need to see her?     Pt stated she has appt with cardio soon.

## 2024-11-04 NOTE — TELEPHONE ENCOUNTER
Metoprolol is more for the palpitations and not for shortness of breath. I cannot tell over the phone about the shortness of breath. If you are experiencing acute problem than you need to go to urgent care or ER. Metoprolol will not going to improve it.

## 2024-11-04 NOTE — TELEPHONE ENCOUNTER
25 mg is the lowest dose.  You should stop taking metoprolol because of side effects as it can cause feeling dizzy and fatigue. Do not take it anymore and just follow up with Cardiology.

## 2024-11-04 NOTE — TELEPHONE ENCOUNTER
Pt called stating the metoprolol prescription isn't hleping and pt still having a hard time breathing. Please advise...

## 2024-11-05 NOTE — TELEPHONE ENCOUNTER
Pt called back, informed pt to stop medication and to follow up with cardio.   SURGERY PA NOTE ON BEHALF OF DR. ANN / GENERAL SURGERY:    S: Patient seen and examined at bedside.  No overnight events noted.  Denies new complaints.  Patient tolerating clears without post-prandial abdominal pain, nausea, or vomiting - though does reports some bloating and abdominal fullness after taking PO.       MEDICATIONS:  benzocaine 20% Spray 1 Spray(s) Topical three times a day PRN  enalaprilat Injectable 1.25 milliGRAM(s) IV Push every 6 hours  influenza  Vaccine (HIGH DOSE) 0.7 milliLiter(s) IntraMuscular once  lactated ringers. 1000 milliLiter(s) IV Continuous <Continuous>  levothyroxine Injectable 87.5 MICROGram(s) IV Push at bedtime  LORazepam   Injectable 0.5 milliGRAM(s) IV Push every 12 hours PRN  metoprolol tartrate Injectable 2.5 milliGRAM(s) IV Push every 6 hours  ondansetron Injectable 4 milliGRAM(s) IV Push every 6 hours PRN  pantoprazole  Injectable 40 milliGRAM(s) IV Push daily  piperacillin/tazobactam IVPB.. 3.375 Gram(s) IV Intermittent every 8 hours  potassium chloride    Tablet ER 40 milliEquivalent(s) Oral every 4 hours      O:  Vital Signs Last 24 Hrs  T(C): 36.8 (26 Jan 2022 05:00), Max: 36.8 (25 Jan 2022 19:31)  T(F): 98.2 (26 Jan 2022 05:00), Max: 98.3 (25 Jan 2022 19:31)  HR: 74 (26 Jan 2022 05:00) (69 - 74)  BP: 136/72 (26 Jan 2022 05:00) (136/64 - 158/68)  RR: 17 (26 Jan 2022 05:00) (17 - 18)  SpO2: 92% (26 Jan 2022 05:00) (92% - 93%)    I&O SUMMARY:    01-25-22 @ 07:01  -  01-26-22 @ 07:00  --------------------------------------------------------  IN: 2225 mL / OUT: 750 mL / NET: 1475 mL      PE:  Lungs: CTA bilat without W/R/R  Card: S1S2  Abd: Mild to moderate distention.  Mild tenderness at upper quadrants.  Colostomy functioning with stool and air in bag, though not emptied since yesterday per RN.  Hypoactive, but present BS x 4.  Both dressings C/D/I    Ext: Mild calf tenderness RLE, non-tender LLE - no edema bilat LE     LABS:                        11.3   5.09  )-----------( 100      ( 26 Jan 2022 09:30 )             34.5     01-26    140  |  104  |  14  ----------------------------<  102<H>  3.2<L>   |  27  |  0.89    Ca    8.3<L>      26 Jan 2022 09:30    TPro  6.5  /  Alb  2.3<L>  /  TBili  0.7  /  DBili  x   /  AST  17  /  ALT  13  /  AlkPhos  95  01-26      A:  77-yo female a/w HgSBO  Also with 2 persistent abdominal wounds    P:  Patient with slow improvement, seems to have plateaued today - though no new complaints, and without N/V  Continue CLD for now  No leukocytosis appreciated, vitals stable and reassuring, remains afebrile   Hypokalemia noted - repleted PO, lytes otherwise stable  H&H remains stable   Some calf tenderness on right - will order venous doppler   Will d/w Dr. Ann, will follow

## 2024-11-05 NOTE — TELEPHONE ENCOUNTER
Called pt to inform on both home and cell phone. No option to leave for voicemail due to full mailbox and not voicemail set up.

## 2024-11-11 ENCOUNTER — OFFICE VISIT (OUTPATIENT)
Dept: CARDIOLOGY | Facility: HOSPITAL | Age: 59
End: 2024-11-11
Payer: COMMERCIAL

## 2024-11-11 VITALS
BODY MASS INDEX: 40.13 KG/M2 | WEIGHT: 241.18 LBS | DIASTOLIC BLOOD PRESSURE: 78 MMHG | OXYGEN SATURATION: 98 % | HEART RATE: 73 BPM | SYSTOLIC BLOOD PRESSURE: 130 MMHG

## 2024-11-11 DIAGNOSIS — R00.2 PALPITATIONS: Primary | ICD-10-CM

## 2024-11-11 DIAGNOSIS — E78.5 HYPERLIPIDEMIA, UNSPECIFIED HYPERLIPIDEMIA TYPE: ICD-10-CM

## 2024-11-11 LAB
ATRIAL RATE: 64 BPM
P AXIS: -6 DEGREES
P OFFSET: 193 MS
P ONSET: 142 MS
PR INTERVAL: 160 MS
Q ONSET: 222 MS
QRS COUNT: 11 BEATS
QRS DURATION: 80 MS
QT INTERVAL: 376 MS
QTC CALCULATION(BAZETT): 387 MS
QTC FREDERICIA: 384 MS
R AXIS: 36 DEGREES
T AXIS: 64 DEGREES
T OFFSET: 410 MS
VENTRICULAR RATE: 64 BPM

## 2024-11-11 PROCEDURE — 93005 ELECTROCARDIOGRAM TRACING: CPT | Performed by: NURSE PRACTITIONER

## 2024-11-11 PROCEDURE — 99204 OFFICE O/P NEW MOD 45 MIN: CPT | Performed by: NURSE PRACTITIONER

## 2024-11-11 PROCEDURE — 1036F TOBACCO NON-USER: CPT | Performed by: NURSE PRACTITIONER

## 2024-11-11 PROCEDURE — 99214 OFFICE O/P EST MOD 30 MIN: CPT | Performed by: NURSE PRACTITIONER

## 2024-11-11 ASSESSMENT — ENCOUNTER SYMPTOMS
ENDOCRINE NEGATIVE: 1
CONSTITUTIONAL NEGATIVE: 1
RESPIRATORY NEGATIVE: 1
PSYCHIATRIC NEGATIVE: 1
HEMATOLOGIC/LYMPHATIC NEGATIVE: 1
PALPITATIONS: 1
EYES NEGATIVE: 1
GASTROINTESTINAL NEGATIVE: 1
MUSCULOSKELETAL NEGATIVE: 1
NEUROLOGICAL NEGATIVE: 1

## 2024-11-11 NOTE — PROGRESS NOTES
Referred by Dr. Mcclain for New Patient Visit (Patient states her watch advised her she was in afib, she went to [facility] and was diagnosed with atrial flutter and afib.  She stopped the toprol xl d/t dizziness, fatigue and that her BP was too low.)     History Of Present Illness:    Dear Dr. Mcclain,     I had the pleasure of meeting Mrs. Lucio today at [facility] for evaluation of heart palpitations. The patient is seen in collaboration with Dr. Newby. Mrs. Lucio is a very pleasant 59 year old female with a history of anxiety, asthma, GERD and hysterectomy, denies history of smoking. Family history of HTN. Mrs. Lucio was recently in the ER in October for heart palpitations. EKG in the ER showed atrial flutter and then converted to sinus rhythm. She has had intermittent heart palpitations for a while, episodes occur at any time. Episodes are brief in nature. Her  has noticed she is short of breath on exertion. She was started on Metoprolol and was unable to tolerate the medication.     Review of Systems   Constitutional: Negative.   HENT: Negative.     Eyes: Negative.    Cardiovascular:  Positive for palpitations.   Respiratory: Negative.     Endocrine: Negative.    Hematologic/Lymphatic: Negative.    Skin: Negative.    Musculoskeletal: Negative.    Gastrointestinal: Negative.    Neurological: Negative.    Psychiatric/Behavioral: Negative.        Past Medical History:  She has a past medical history of Acute candidiasis of vulva and vagina (01/21/2016), Acute nasopharyngitis (common cold) (07/22/2015), Cellulitis of left axilla (09/25/2019), Chronic cough (01/29/2016), Dysphonia (03/31/2014), Encounter for immunization (10/24/2018), Encounter for screening for malignant neoplasm of colon (07/18/2018), Fibromyalgia (10/26/2022), Other conditions influencing health status (01/29/2016), Other conditions influencing health status, Other diseases of vocal cords (09/08/2014), Personal  history of diseases of the skin and subcutaneous tissue (03/26/2014), Personal history of other diseases of the respiratory system (01/18/2016), Personal history of other diseases of the respiratory system (01/04/2018), Personal history of other infectious and parasitic diseases (02/12/2016), Personal history of other infectious and parasitic diseases (12/07/2017), Personal history of other specified conditions (07/12/2019), Personal history of other specified conditions (01/29/2016), Personal history of other specified conditions (10/28/2015), Stress incontinence (female) (male) (01/29/2016), Stress incontinence (female) (male) (01/29/2016), Unspecified asthma with (acute) exacerbation ([facility]) (01/29/2016), Unspecified symptoms and signs involving the genitourinary system (03/01/2018), Unspecified symptoms and signs involving the genitourinary system (03/15/2018), and Yeast infection (04/11/2023).    Past Surgical History:  She has a past surgical history that includes Hysterectomy (03/31/2014).      Social History:  She reports that she has never smoked. She has never used smokeless tobacco. She reports that she does not currently use alcohol. She reports that she does not use drugs.    Family History:  No family history on file.     Allergies:  Mold, Ciprofloxacin, and Statins-hmg-coa reductase inhibitors    Outpatient Medications:  Current Outpatient Medications   Medication Instructions    albuterol 90 mcg/actuation inhaler 2 puffs, inhalation, Every 6 hours PRN    celecoxib (CeleBREX) 100 mg capsule     cetirizine-pseudoephedrine (ZyrTEC-D) 5-120 mg 12 hr tablet 1 tablet, 2 times daily PRN    cyclobenzaprine (FLEXERIL) 10 mg, oral, 3 times daily PRN    estradiol (Yamilex) 0.05 mg/24 hr patch APPLY 1 PATCH TOPICALLY TO SKIN  TWICE WEEKLY    eszopiclone (LUNESTA) 3 mg, oral, Nightly    fluticasone (Flonase) 50 mcg/actuation nasal spray 2 sprays, Each Nostril, Daily, Shake gently. Before first use, prime pump.  After use, clean tip and replace cap.    furosemide (LASIX) 40 mg, oral, Daily    gabapentin (Neurontin) 100 mg capsule TAKE 1 CAPSULE BY MOUTH 3 TIMES  DAILY    ibuprofen 600 mg tablet     ipratropium-albuteroL (Duo-Neb) 0.5-2.5 mg/3 mL nebulizer solution 3 mL, nebulization, 4 times daily PRN    LORazepam (ATIVAN) 0.5 mg, oral, 2 times daily PRN    meloxicam (Mobic) 15 mg tablet TAKE 1 TABLET BY MOUTH DAILY AS  NEEDED    metoprolol succinate XL (TOPROL-XL) 25 mg, oral, Daily, Do not crush or chew.    montelukast (SINGULAIR) 10 mg, oral, Daily    omeprazole (PRILOSEC) 40 mg, oral, Daily    traZODone (DESYREL) 100 mg, oral, Nightly PRN    Vitamin D3 125 mcg (5,000 unit) tablet         Last Recorded Vitals:  Vitals:    11/11/24 1322   BP: 130/78   Pulse: 73   SpO2: 98%   Weight: 109 kg (241 lb 2.9 oz)       Physical Exam:  Physical Exam  Vitals reviewed.   HENT:      Head: Normocephalic.      Nose: Nose normal.   Eyes:      Pupils: Pupils are equal, round, and reactive to light.   Cardiovascular:      Rate and Rhythm: Normal rate and regular rhythm.   Pulmonary:      Effort: Pulmonary effort is normal.      Breath sounds: Normal breath sounds.   Abdominal:      General: Abdomen is flat.      Palpations: Abdomen is soft.   Musculoskeletal:         General: Normal range of motion.      Cervical back: Normal range of motion.   Skin:     General: Skin is warm and dry.   Neurological:      General: No focal deficit present.      Mental Status: He is alert and oriented to person, place, and time.   Psychiatric:         Mood and Affect: Mood normal.            Last Labs:  CBC -  Lab Results   Component Value Date    WBC 7.0 12/28/2023    HGB 13.7 12/28/2023    HCT 42.6 12/28/2023    MCV 90 12/28/2023     12/28/2023       CMP -  Lab Results   Component Value Date    CALCIUM 9.1 12/28/2023    PHOS 2.8 03/20/2023    PROT 6.1 (L) 12/28/2023    ALBUMIN 4.2 12/28/2023    AST 14 12/28/2023    ALT 19 12/28/2023    ALKPHOS 56  12/28/2023    BILITOT 0.4 12/28/2023       LIPID PANEL -   Lab Results   Component Value Date    CHOL 251 (H) 12/28/2023    TRIG 228 (H) 12/28/2023    HDL 51.6 12/28/2023    CHHDL 4.9 12/28/2023    LDLF 164 (H) 10/01/2022    VLDL 46 (H) 12/28/2023    NHDL 199 (H) 12/28/2023       RENAL FUNCTION PANEL -   Lab Results   Component Value Date    GLUCOSE 83 12/28/2023     12/28/2023    K 4.2 12/28/2023     12/28/2023    CO2 26 12/28/2023    ANIONGAP 14 12/28/2023    BUN 17 12/28/2023    CREATININE 0.75 12/28/2023    CALCIUM 9.1 12/28/2023    PHOS 2.8 03/20/2023    ALBUMIN 4.2 12/28/2023        Lab Results   Component Value Date    HGBA1C 5.1 12/28/2023       Last Cardiology Tests:  ECG:  EKG independently reviewed from today showed sinus rhythm heart rate 64 bpm   Echo:  Transthoracic Echo (TTE) Complete 10/18/2024  1. Left ventricular ejection fraction is normal, by visual estimate at 60-65%.   2. There is normal right ventricular global systolic function.  Ejection Fractions:  EF   Date/Time Value Ref Range Status   10/18/2024 11:15 AM 63 %        Cath:    Stress Test:    Cardiac Imaging:      Assessment/Plan   Mrs. Lucio is a very pleasant 59 year old female with a history of anxiety, and asthma, she was recently in the ER with heart palpitations. Monitor showed sinus rhythm with 10 episodes of SVT, no atrial fibrillation on the monitor. Echocardiogram shows a persevered LV function. Has intermittent heart palpitations, brief in nature. She was given reassurance. She will take the Metoprolol ER 25 mg a half a tablet daily as needed. She will be set up for a CT Calcium score given her risk factors. Heart rate and blood pressure are well controlled today     Plan   -call with any questions   -take Metoprolol ER 25 mg a half a tablet as needed   -CT Calcium score   -will call to review the results     I appreciate the opportunity to participate in the patient's care, please call with any questions          Rossana Stanford, APRN-CNP

## 2024-11-11 NOTE — PATIENT INSTRUCTIONS
CALL WITH ANY QUESTIONS   CT CALCIUM SCORE   TAKE THE METOPROLOL WHEN NEEDED   WILL CALL TO REVIEW THE RESULTS

## 2024-11-13 ENCOUNTER — TELEPHONE (OUTPATIENT)
Dept: PRIMARY CARE | Facility: CLINIC | Age: 59
End: 2024-11-13
Payer: COMMERCIAL

## 2024-11-13 DIAGNOSIS — E03.9 ACQUIRED HYPOTHYROIDISM: ICD-10-CM

## 2024-11-13 DIAGNOSIS — J30.9 ALLERGIC RHINITIS, UNSPECIFIED SEASONALITY, UNSPECIFIED TRIGGER: ICD-10-CM

## 2024-11-13 DIAGNOSIS — E78.2 MIXED HYPERLIPIDEMIA: Primary | ICD-10-CM

## 2024-11-13 DIAGNOSIS — J32.9 OTHER SINUSITIS, UNSPECIFIED CHRONICITY: ICD-10-CM

## 2024-11-13 DIAGNOSIS — Z78.0 ASYMPTOMATIC MENOPAUSAL STATE: ICD-10-CM

## 2024-11-13 DIAGNOSIS — F41.1 ANXIETY STATE: ICD-10-CM

## 2024-11-13 DIAGNOSIS — Z12.31 BREAST CANCER SCREENING BY MAMMOGRAM: ICD-10-CM

## 2024-11-13 RX ORDER — FLUTICASONE PROPIONATE 50 MCG
1 SPRAY, SUSPENSION (ML) NASAL DAILY
Qty: 16 G | Refills: 5 | Status: SHIPPED | OUTPATIENT
Start: 2024-11-13 | End: 2025-05-12

## 2024-11-13 RX ORDER — FLUTICASONE PROPIONATE 50 MCG
SPRAY, SUSPENSION (ML) NASAL
Refills: 0 | OUTPATIENT
Start: 2024-11-13

## 2024-11-13 NOTE — TELEPHONE ENCOUNTER
Pt has a virtual appt on 11/22 for follow up visit. Pt stated she was supposed to have mammogram, bone density orders in but get them done outside of  so needs those printed off.     Also pt stated she's supposed to get labs done as well and wants the order in so she can get it done before appt.

## 2024-11-13 NOTE — PROGRESS NOTES
Subjective   Patient ID: Chelsie Lucio is a 59 y.o. female who presents for No chief complaint on file..    HPI     Review of Systems    Objective   There were no vitals taken for this visit.    Physical Exam    Assessment/Plan   Problem List Items Addressed This Visit       Anxiety state    Relevant Orders    Comprehensive metabolic panel    Hyperlipidemia - Primary    Relevant Orders    Lipid Panel    Hypothyroidism    Relevant Orders    TSH with reflex to Free T4 if abnormal     Other Visit Diagnoses       Breast cancer screening by mammogram        Relevant Orders    BI mammo bilateral screening tomosynthesis

## 2024-11-13 NOTE — TELEPHONE ENCOUNTER
I ordered mammogram and DEXA scan for bone density. I also ordered labs.  You can print mammogram and bone density order if she want to get it done outside.

## 2024-11-18 ENCOUNTER — HOSPITAL ENCOUNTER (OUTPATIENT)
Dept: RADIOLOGY | Facility: HOSPITAL | Age: 59
Discharge: HOME | End: 2024-11-18
Payer: COMMERCIAL

## 2024-11-18 DIAGNOSIS — E78.5 HYPERLIPIDEMIA, UNSPECIFIED HYPERLIPIDEMIA TYPE: ICD-10-CM

## 2024-11-18 PROCEDURE — 75571 CT HRT W/O DYE W/CA TEST: CPT

## 2024-11-22 ENCOUNTER — APPOINTMENT (OUTPATIENT)
Dept: PRIMARY CARE | Facility: CLINIC | Age: 59
End: 2024-11-22
Payer: COMMERCIAL

## 2024-12-04 ENCOUNTER — APPOINTMENT (OUTPATIENT)
Dept: PRIMARY CARE | Facility: CLINIC | Age: 59
End: 2024-12-04
Payer: COMMERCIAL

## 2024-12-04 DIAGNOSIS — G47.30 SLEEP APNEA, UNSPECIFIED TYPE: Primary | ICD-10-CM

## 2024-12-04 PROCEDURE — 99213 OFFICE O/P EST LOW 20 MIN: CPT | Performed by: INTERNAL MEDICINE

## 2024-12-04 ASSESSMENT — ENCOUNTER SYMPTOMS
SHORTNESS OF BREATH: 0
JOINT SWELLING: 0
POLYDIPSIA: 0
SEIZURES: 0
ADENOPATHY: 0
STRIDOR: 0
FLANK PAIN: 0
DIZZINESS: 0
HEADACHES: 0
COUGH: 0
PHOTOPHOBIA: 0
POLYPHAGIA: 0
PALPITATIONS: 0
WEAKNESS: 0
UNEXPECTED WEIGHT CHANGE: 0
APPETITE CHANGE: 0
SLEEP DISTURBANCE: 0
DYSURIA: 0
DIARRHEA: 0
ARTHRALGIAS: 0
MYALGIAS: 0
SINUS PAIN: 0
TROUBLE SWALLOWING: 0
FATIGUE: 0
NERVOUS/ANXIOUS: 0
HEMATURIA: 0
ACTIVITY CHANGE: 0
COLOR CHANGE: 0
NECK PAIN: 0
NAUSEA: 0
FACIAL ASYMMETRY: 0
CONSTIPATION: 0
BLOOD IN STOOL: 0
SORE THROAT: 0
SPEECH DIFFICULTY: 0
VOICE CHANGE: 0
WHEEZING: 0
VOMITING: 0
TREMORS: 0
CONFUSION: 0
BACK PAIN: 0
HALLUCINATIONS: 0
FEVER: 0
ABDOMINAL PAIN: 0
CHEST TIGHTNESS: 0
EYE PAIN: 0
WOUND: 0
NUMBNESS: 0

## 2024-12-04 NOTE — PROGRESS NOTES
Telemedicine visit is conducted with the patient with her consent about the medical problem as documented below.   Communication with the patient is face to face over the secured video call.    I have communicated my name and active licensure. The patient's identity and physical location were verified at the time of this visit. Either the patient or their legal representative has been informed of the risks and benefits of treatment through a remote evaluation and consents to proceed with the evaluation remotely.     Subjective   Patient ID: Chelsie Lucio is a 59 y.o. female who presents for Follow-up.    HPI   Patient has been seen by Cardiology and advised to cut down on metoprolol and take 1/2 tablet. Patient said that when she took the medicine she felt actually worse than not taking it. So she stop taking it.    Patient still has shortness of breath with occasional palpitations.    Her BMI is 40.13    She has been discussed about getting a sleep study evaluation.    Review of Systems   Constitutional:  Negative for activity change, appetite change, fatigue, fever and unexpected weight change.   HENT:  Negative for dental problem, ear discharge, hearing loss, nosebleeds, postnasal drip, sinus pain, sore throat, trouble swallowing and voice change.    Eyes:  Negative for photophobia, pain and visual disturbance.   Respiratory:  Negative for cough, chest tightness, shortness of breath, wheezing and stridor.    Cardiovascular:  Negative for chest pain, palpitations and leg swelling.   Gastrointestinal:  Negative for abdominal pain, blood in stool, constipation, diarrhea, nausea and vomiting.   Endocrine: Negative for polydipsia, polyphagia and polyuria.   Genitourinary:  Negative for decreased urine volume, dyspareunia, dysuria, flank pain, hematuria and urgency.   Musculoskeletal:  Negative for arthralgias, back pain, gait problem, joint swelling, myalgias and neck pain.   Skin:  Negative for color change, rash and  wound.   Allergic/Immunologic: Negative for environmental allergies and food allergies.   Neurological:  Negative for dizziness, tremors, seizures, syncope, facial asymmetry, speech difficulty, weakness, numbness and headaches.   Hematological:  Negative for adenopathy.   Psychiatric/Behavioral:  Negative for behavioral problems, confusion, hallucinations, self-injury, sleep disturbance and suicidal ideas. The patient is not nervous/anxious.      Objective   There were no vitals taken for this visit.    Physical Exam  Virtual Physical Exam  Awake alert and oriented x 3. Following directions and replying to all questions appropriately.  No use of accessory muscle of respiration. No acute respiratory distress.  Moving all extremities.  Clear bilateral conjunctiva.  No visible skin rash over the face and extremities.  Joints movements of UE and bilateral knee is normal.  Normal mood with appropriate effect and emotions.    Rest of the physical exam is limited due to virtual nature of this visit.    Assessment/Plan   Problem List Items Addressed This Visit    None  Visit Diagnoses       Sleep apnea, unspecified type    -  Primary    Relevant Orders    Home sleep apnea test (HSAT)

## 2024-12-16 DIAGNOSIS — N89.8 VAGINAL DRYNESS: ICD-10-CM

## 2024-12-16 DIAGNOSIS — R06.02 SHORTNESS OF BREATH: ICD-10-CM

## 2024-12-16 DIAGNOSIS — F51.01 PRIMARY INSOMNIA: ICD-10-CM

## 2024-12-16 DIAGNOSIS — K21.9 GASTROESOPHAGEAL REFLUX DISEASE WITHOUT ESOPHAGITIS: ICD-10-CM

## 2024-12-16 RX ORDER — OMEPRAZOLE 40 MG/1
40 CAPSULE, DELAYED RELEASE ORAL DAILY
Qty: 90 CAPSULE | Refills: 3 | Status: SHIPPED | OUTPATIENT
Start: 2024-12-16

## 2024-12-16 RX ORDER — ESTRADIOL 0.05 MG/D
FILM, EXTENDED RELEASE TRANSDERMAL
Qty: 24 PATCH | Refills: 3 | Status: SHIPPED | OUTPATIENT
Start: 2024-12-16

## 2024-12-16 RX ORDER — MONTELUKAST SODIUM 10 MG/1
10 TABLET ORAL DAILY
Qty: 90 TABLET | Refills: 3 | Status: SHIPPED | OUTPATIENT
Start: 2024-12-16

## 2024-12-17 RX ORDER — ESZOPICLONE 3 MG/1
3 TABLET, FILM COATED ORAL NIGHTLY
Qty: 90 TABLET | Refills: 0 | Status: SHIPPED | OUTPATIENT
Start: 2024-12-17

## 2024-12-30 ENCOUNTER — TELEPHONE (OUTPATIENT)
Dept: PRIMARY CARE | Facility: CLINIC | Age: 59
End: 2024-12-30

## 2024-12-30 ENCOUNTER — LAB (OUTPATIENT)
Dept: LAB | Facility: LAB | Age: 59
End: 2024-12-30
Payer: COMMERCIAL

## 2024-12-30 DIAGNOSIS — Z51.81 THERAPEUTIC DRUG MONITORING: Primary | ICD-10-CM

## 2024-12-30 DIAGNOSIS — F41.1 ANXIETY STATE: ICD-10-CM

## 2024-12-30 DIAGNOSIS — E78.2 MIXED HYPERLIPIDEMIA: ICD-10-CM

## 2024-12-30 DIAGNOSIS — Z51.81 THERAPEUTIC DRUG MONITORING: ICD-10-CM

## 2024-12-30 DIAGNOSIS — E03.9 ACQUIRED HYPOTHYROIDISM: ICD-10-CM

## 2024-12-30 LAB
ALBUMIN SERPL BCP-MCNC: 4.2 G/DL (ref 3.4–5)
ALP SERPL-CCNC: 62 U/L (ref 33–110)
ALT SERPL W P-5'-P-CCNC: 34 U/L (ref 7–45)
ANION GAP SERPL CALC-SCNC: 13 MMOL/L (ref 10–20)
AST SERPL W P-5'-P-CCNC: 23 U/L (ref 9–39)
BILIRUB SERPL-MCNC: 0.5 MG/DL (ref 0–1.2)
BUN SERPL-MCNC: 13 MG/DL (ref 6–23)
CALCIUM SERPL-MCNC: 8.4 MG/DL (ref 8.6–10.3)
CHLORIDE SERPL-SCNC: 105 MMOL/L (ref 98–107)
CHOLEST SERPL-MCNC: 274 MG/DL (ref 0–199)
CHOLESTEROL/HDL RATIO: 4.6
CO2 SERPL-SCNC: 26 MMOL/L (ref 21–32)
CREAT SERPL-MCNC: 0.78 MG/DL (ref 0.5–1.05)
EGFRCR SERPLBLD CKD-EPI 2021: 89 ML/MIN/1.73M*2
GLUCOSE SERPL-MCNC: 82 MG/DL (ref 74–99)
HDLC SERPL-MCNC: 59.2 MG/DL
LDLC SERPL CALC-MCNC: 176 MG/DL
NON HDL CHOLESTEROL: 215 MG/DL (ref 0–149)
POTASSIUM SERPL-SCNC: 4 MMOL/L (ref 3.5–5.3)
PROT SERPL-MCNC: 6.2 G/DL (ref 6.4–8.2)
SODIUM SERPL-SCNC: 140 MMOL/L (ref 136–145)
TRIGL SERPL-MCNC: 193 MG/DL (ref 0–149)
TSH SERPL-ACNC: 1.67 MIU/L (ref 0.44–3.98)
VLDL: 39 MG/DL (ref 0–40)

## 2024-12-30 PROCEDURE — 80365 DRUG SCREENING OXYCODONE: CPT

## 2024-12-30 PROCEDURE — 80368 SEDATIVE HYPNOTICS: CPT

## 2024-12-30 PROCEDURE — 80346 BENZODIAZEPINES1-12: CPT

## 2024-12-30 PROCEDURE — 84443 ASSAY THYROID STIM HORMONE: CPT

## 2024-12-30 PROCEDURE — 80354 DRUG SCREENING FENTANYL: CPT

## 2024-12-30 PROCEDURE — 82570 ASSAY OF URINE CREATININE: CPT

## 2024-12-30 PROCEDURE — 80307 DRUG TEST PRSMV CHEM ANLYZR: CPT

## 2024-12-30 PROCEDURE — 80358 DRUG SCREENING METHADONE: CPT

## 2024-12-30 PROCEDURE — 80061 LIPID PANEL: CPT

## 2024-12-30 PROCEDURE — 80053 COMPREHEN METABOLIC PANEL: CPT

## 2024-12-30 PROCEDURE — 80361 OPIATES 1 OR MORE: CPT

## 2024-12-30 PROCEDURE — 80373 DRUG SCREENING TRAMADOL: CPT

## 2024-12-30 NOTE — TELEPHONE ENCOUNTER
Virtual visit in December. Wanting [facility] drug screen. Patient's csa/uds are . Do you want to add to her labs or have her in office for another visit for that ?

## 2024-12-31 LAB
AMPHETAMINES UR QL SCN: NORMAL
BARBITURATES UR QL SCN: NORMAL
BZE UR QL SCN: NORMAL
CANNABINOIDS UR QL SCN: NORMAL
CREAT UR-MCNC: 136.4 MG/DL (ref 20–320)
PCP UR QL SCN: NORMAL

## 2025-02-11 ENCOUNTER — TELEPHONE (OUTPATIENT)
Dept: CARDIOLOGY | Facility: CLINIC | Age: 60
End: 2025-02-11
Payer: COMMERCIAL

## 2025-03-10 DIAGNOSIS — F51.01 PRIMARY INSOMNIA: ICD-10-CM

## 2025-03-13 DIAGNOSIS — N30.00 ACUTE CYSTITIS WITHOUT HEMATURIA: Primary | ICD-10-CM

## 2025-03-13 NOTE — TELEPHONE ENCOUNTER
Patient called in c/o possible UTI symptoms.  Wondering if we can put in lab orders for her to get tested, she's in [place].

## 2025-03-14 DIAGNOSIS — F51.01 PRIMARY INSOMNIA: ICD-10-CM

## 2025-03-14 DIAGNOSIS — N30.00 ACUTE CYSTITIS WITHOUT HEMATURIA: Primary | ICD-10-CM

## 2025-03-14 RX ORDER — ESZOPICLONE 3 MG/1
3 TABLET, FILM COATED ORAL NIGHTLY
Qty: 90 TABLET | Refills: 0 | Status: SHIPPED | OUTPATIENT
Start: 2025-03-14

## 2025-03-15 LAB
APPEARANCE UR: CLEAR
BACTERIA #/AREA URNS HPF: ABNORMAL /HPF
BILIRUB UR QL STRIP: NEGATIVE
COLOR UR: YELLOW
GLUCOSE UR QL STRIP: NEGATIVE
HGB UR QL STRIP: NEGATIVE
HYALINE CASTS #/AREA URNS LPF: ABNORMAL /LPF
KETONES UR QL STRIP: NEGATIVE
LEUKOCYTE ESTERASE UR QL STRIP: ABNORMAL
NITRITE UR QL STRIP: NEGATIVE
PH UR STRIP: 6 [PH] (ref 5–8)
PROT UR QL STRIP: NEGATIVE
RBC #/AREA URNS HPF: ABNORMAL /HPF
SERVICE CMNT-IMP: ABNORMAL
SP GR UR STRIP: 1.01 (ref 1–1.03)
SQUAMOUS #/AREA URNS HPF: ABNORMAL /HPF
WBC #/AREA URNS HPF: ABNORMAL /HPF

## 2025-03-15 RX ORDER — ESZOPICLONE 3 MG/1
3 TABLET, FILM COATED ORAL NIGHTLY
Qty: 90 TABLET | Refills: 0 | OUTPATIENT
Start: 2025-03-15

## 2025-03-15 RX ORDER — NITROFURANTOIN 25; 75 MG/1; MG/1
100 CAPSULE ORAL 2 TIMES DAILY
Qty: 14 CAPSULE | Refills: 0 | Status: SHIPPED | OUTPATIENT
Start: 2025-03-15 | End: 2025-03-22

## 2025-03-19 ENCOUNTER — APPOINTMENT (OUTPATIENT)
Dept: PRIMARY CARE | Facility: CLINIC | Age: 60
End: 2025-03-19
Payer: COMMERCIAL

## 2025-03-19 VITALS
SYSTOLIC BLOOD PRESSURE: 118 MMHG | WEIGHT: 246 LBS | OXYGEN SATURATION: 98 % | HEART RATE: 78 BPM | BODY MASS INDEX: 40.98 KG/M2 | TEMPERATURE: 97.4 F | DIASTOLIC BLOOD PRESSURE: 86 MMHG | HEIGHT: 65 IN

## 2025-03-19 DIAGNOSIS — J45.20 MILD INTERMITTENT ASTHMA WITHOUT COMPLICATION (HHS-HCC): ICD-10-CM

## 2025-03-19 DIAGNOSIS — E78.2 MIXED HYPERLIPIDEMIA: ICD-10-CM

## 2025-03-19 DIAGNOSIS — Z78.0 ASYMPTOMATIC MENOPAUSAL STATE: ICD-10-CM

## 2025-03-19 DIAGNOSIS — Z12.31 BREAST CANCER SCREENING BY MAMMOGRAM: ICD-10-CM

## 2025-03-19 DIAGNOSIS — Z00.00 ENCOUNTER FOR ANNUAL PHYSICAL EXAM: Primary | ICD-10-CM

## 2025-03-19 DIAGNOSIS — I48.3 TYPICAL ATRIAL FLUTTER (MULTI): ICD-10-CM

## 2025-03-19 PROCEDURE — 99396 PREV VISIT EST AGE 40-64: CPT | Performed by: INTERNAL MEDICINE

## 2025-03-19 PROCEDURE — 3008F BODY MASS INDEX DOCD: CPT | Performed by: INTERNAL MEDICINE

## 2025-03-19 PROCEDURE — 99213 OFFICE O/P EST LOW 20 MIN: CPT | Performed by: INTERNAL MEDICINE

## 2025-03-19 RX ORDER — METOPROLOL TARTRATE 25 MG/1
25 TABLET, FILM COATED ORAL 2 TIMES DAILY
Qty: 60 TABLET | Refills: 5 | Status: SHIPPED | OUTPATIENT
Start: 2025-03-19 | End: 2025-09-15

## 2025-03-19 RX ORDER — PREDNISONE 20 MG/1
20 TABLET ORAL DAILY
Qty: 7 TABLET | Refills: 0 | Status: SHIPPED | OUTPATIENT
Start: 2025-03-19 | End: 2025-03-26

## 2025-03-19 RX ORDER — EZETIMIBE 10 MG/1
10 TABLET ORAL DAILY
Qty: 30 TABLET | Refills: 5 | Status: SHIPPED | OUTPATIENT
Start: 2025-03-19 | End: 2025-09-15

## 2025-03-19 NOTE — PROGRESS NOTES
"Subjective   Patient ID: Chelsie Lucio is a 60 y.o. female who presents for Annual Exam.    HPI   Patient presented to the office for annual physical exam.  She still did not get her mammogram as it has been discussed several times with the patient in the past.     Review of Systems   Constitutional:  Negative for activity change, appetite change, fatigue, fever and unexpected weight change.   HENT:  Negative for dental problem, ear discharge, hearing loss, nosebleeds, postnasal drip, sinus pain, sore throat, trouble swallowing and voice change.    Eyes:  Negative for photophobia, pain and visual disturbance.   Respiratory:  Positive for shortness of breath. Negative for cough, chest tightness, wheezing and stridor.    Cardiovascular:  Negative for chest pain, palpitations and leg swelling.   Gastrointestinal:  Negative for abdominal pain, blood in stool, constipation, diarrhea, nausea and vomiting.   Endocrine: Negative for polydipsia, polyphagia and polyuria.   Genitourinary:  Negative for decreased urine volume, dyspareunia, dysuria, flank pain, frequency, hematuria and urgency.   Musculoskeletal:  Negative for arthralgias, back pain, gait problem, joint swelling, myalgias and neck pain.   Skin:  Negative for color change, rash and wound.   Allergic/Immunologic: Negative for environmental allergies and food allergies.   Neurological:  Negative for dizziness, tremors, seizures, syncope, facial asymmetry, speech difficulty, weakness, numbness and headaches.   Hematological:  Negative for adenopathy.   Psychiatric/Behavioral:  Negative for behavioral problems, confusion, hallucinations, self-injury, sleep disturbance and suicidal ideas. The patient is not nervous/anxious.        Objective   /86   Pulse 78   Temp 36.3 °C (97.4 °F)   Ht 1.651 m (5' 5\")   Wt 112 kg (246 lb)   SpO2 98%   BMI 40.94 kg/m²     Physical Exam  Vitals and nursing note reviewed.   Constitutional:       General: She is not in acute " distress.     Appearance: Normal appearance. She is obese. She is not ill-appearing or toxic-appearing.   HENT:      Nose: Nose normal.   Eyes:      Conjunctiva/sclera: Conjunctivae normal.      Pupils: Pupils are equal, round, and reactive to light.   Cardiovascular:      Rate and Rhythm: Normal rate and regular rhythm.      Pulses: Normal pulses.      Heart sounds: Normal heart sounds. No murmur heard.  Pulmonary:      Effort: Pulmonary effort is normal. No respiratory distress.      Breath sounds: No stridor. Wheezing present. No rhonchi or rales.   Abdominal:      General: Bowel sounds are normal.      Tenderness: There is no abdominal tenderness.   Musculoskeletal:         General: No swelling or deformity. Normal range of motion.      Cervical back: Normal range of motion and neck supple. No rigidity or tenderness.   Skin:     General: Skin is warm.      Coloration: Skin is not jaundiced.      Findings: No bruising, erythema or rash.   Neurological:      General: No focal deficit present.      Mental Status: She is alert and oriented to person, place, and time.      Cranial Nerves: No cranial nerve deficit.      Gait: Gait normal.   Psychiatric:         Mood and Affect: Mood normal.         Behavior: Behavior normal.         Thought Content: Thought content normal.         Judgment: Judgment normal.       Assessment/Plan   Problem List Items Addressed This Visit       Mild intermittent asthma without complication (HHS-HCC)    Relevant Medications    predniSONE (Deltasone) 20 mg tablet    Hyperlipidemia    Relevant Medications    ezetimibe (Zetia) 10 mg tablet    Typical atrial flutter (Multi)    Relevant Medications    metoprolol tartrate (Lopressor) 25 mg tablet     Other Visit Diagnoses       Encounter for annual physical exam    -  Primary    Asymptomatic menopausal state        Relevant Orders    XR DEXA bone density    Breast cancer screening by mammogram        Relevant Orders    BI mammo bilateral  screening tomosynthesis

## 2025-03-23 ASSESSMENT — ENCOUNTER SYMPTOMS
TREMORS: 0
MYALGIAS: 0
WOUND: 0
FLANK PAIN: 0
BACK PAIN: 0
DYSURIA: 0
ARTHRALGIAS: 0
FACIAL ASYMMETRY: 0
ABDOMINAL PAIN: 0
VOMITING: 0
PALPITATIONS: 0
CONFUSION: 0
HALLUCINATIONS: 0
WHEEZING: 0
VOICE CHANGE: 0
UNEXPECTED WEIGHT CHANGE: 0
SORE THROAT: 0
SHORTNESS OF BREATH: 1
BLOOD IN STOOL: 0
APPETITE CHANGE: 0
CONSTIPATION: 0
NERVOUS/ANXIOUS: 0
SPEECH DIFFICULTY: 0
FEVER: 0
DIARRHEA: 0
JOINT SWELLING: 0
DIZZINESS: 0
COLOR CHANGE: 0
HEADACHES: 0
HEMATURIA: 0
SEIZURES: 0
POLYPHAGIA: 0
STRIDOR: 0
EYE PAIN: 0
PHOTOPHOBIA: 0
SINUS PAIN: 0
WEAKNESS: 0
NUMBNESS: 0
POLYDIPSIA: 0
NECK PAIN: 0
ACTIVITY CHANGE: 0
CHEST TIGHTNESS: 0
ADENOPATHY: 0
FREQUENCY: 0
NAUSEA: 0
FATIGUE: 0
SLEEP DISTURBANCE: 0
COUGH: 0
TROUBLE SWALLOWING: 0

## 2025-04-16 ENCOUNTER — TELEPHONE (OUTPATIENT)
Dept: PRIMARY CARE | Facility: CLINIC | Age: 60
End: 2025-04-16
Payer: COMMERCIAL

## 2025-04-16 DIAGNOSIS — J45.20 MILD INTERMITTENT ASTHMA WITHOUT COMPLICATION (HHS-HCC): ICD-10-CM

## 2025-04-16 RX ORDER — ALBUTEROL SULFATE 90 UG/1
2 INHALANT RESPIRATORY (INHALATION) EVERY 6 HOURS PRN
Qty: 8.5 G | Refills: 3 | Status: SHIPPED | OUTPATIENT
Start: 2025-04-16

## 2025-05-10 DIAGNOSIS — F51.01 PRIMARY INSOMNIA: ICD-10-CM

## 2025-05-12 RX ORDER — ESZOPICLONE 3 MG/1
3 TABLET, FILM COATED ORAL NIGHTLY
Qty: 90 TABLET | Refills: 0 | Status: SHIPPED | OUTPATIENT
Start: 2025-05-12

## 2025-06-18 ENCOUNTER — APPOINTMENT (OUTPATIENT)
Dept: PRIMARY CARE | Facility: CLINIC | Age: 60
End: 2025-06-18
Payer: COMMERCIAL

## 2025-06-18 VITALS
DIASTOLIC BLOOD PRESSURE: 82 MMHG | HEIGHT: 65 IN | SYSTOLIC BLOOD PRESSURE: 124 MMHG | BODY MASS INDEX: 40.32 KG/M2 | WEIGHT: 242 LBS | OXYGEN SATURATION: 98 % | TEMPERATURE: 96.8 F | HEART RATE: 67 BPM

## 2025-06-18 DIAGNOSIS — N89.8 VAGINAL DRYNESS: ICD-10-CM

## 2025-06-18 DIAGNOSIS — E78.2 MIXED HYPERLIPIDEMIA: ICD-10-CM

## 2025-06-18 DIAGNOSIS — M19.90 ARTHRITIS: ICD-10-CM

## 2025-06-18 DIAGNOSIS — R73.09 ABNORMAL GLUCOSE: ICD-10-CM

## 2025-06-18 DIAGNOSIS — D50.8 OTHER IRON DEFICIENCY ANEMIA: ICD-10-CM

## 2025-06-18 DIAGNOSIS — R06.02 SHORTNESS OF BREATH: ICD-10-CM

## 2025-06-18 DIAGNOSIS — E55.9 VITAMIN D DEFICIENCY: ICD-10-CM

## 2025-06-18 DIAGNOSIS — K21.9 GASTROESOPHAGEAL REFLUX DISEASE WITHOUT ESOPHAGITIS: ICD-10-CM

## 2025-06-18 DIAGNOSIS — Z12.31 BREAST CANCER SCREENING BY MAMMOGRAM: ICD-10-CM

## 2025-06-18 DIAGNOSIS — E53.8 VITAMIN B12 DEFICIENCY: ICD-10-CM

## 2025-06-18 DIAGNOSIS — E03.9 ACQUIRED HYPOTHYROIDISM: ICD-10-CM

## 2025-06-18 DIAGNOSIS — F51.01 PRIMARY INSOMNIA: Primary | ICD-10-CM

## 2025-06-18 PROCEDURE — 1036F TOBACCO NON-USER: CPT | Performed by: INTERNAL MEDICINE

## 2025-06-18 PROCEDURE — 99215 OFFICE O/P EST HI 40 MIN: CPT | Performed by: INTERNAL MEDICINE

## 2025-06-18 PROCEDURE — 3008F BODY MASS INDEX DOCD: CPT | Performed by: INTERNAL MEDICINE

## 2025-06-18 RX ORDER — ESZOPICLONE 3 MG/1
3 TABLET, FILM COATED ORAL NIGHTLY
Qty: 90 TABLET | Refills: 0 | Status: SHIPPED | OUTPATIENT
Start: 2025-06-18

## 2025-06-18 RX ORDER — RESVER/WINE/BFL/GRPSD/PC/C/POM 200MG-60MG
125 CAPSULE ORAL DAILY
Qty: 90 TABLET | Refills: 1 | Status: SHIPPED | OUTPATIENT
Start: 2025-06-18

## 2025-06-18 RX ORDER — ESTRADIOL 0.05 MG/D
FILM, EXTENDED RELEASE TRANSDERMAL
Qty: 24 PATCH | Refills: 3 | Status: SHIPPED | OUTPATIENT
Start: 2025-06-18

## 2025-06-18 RX ORDER — FLUTICASONE PROPIONATE 50 MCG
1 SPRAY, SUSPENSION (ML) NASAL DAILY
Qty: 16 G | Refills: 5 | Status: SHIPPED | OUTPATIENT
Start: 2025-06-18 | End: 2025-12-15

## 2025-06-18 RX ORDER — OMEPRAZOLE 40 MG/1
40 CAPSULE, DELAYED RELEASE ORAL DAILY
Qty: 90 CAPSULE | Refills: 3 | Status: SHIPPED | OUTPATIENT
Start: 2025-06-18

## 2025-06-18 RX ORDER — MONTELUKAST SODIUM 10 MG/1
10 TABLET ORAL DAILY
Qty: 90 TABLET | Refills: 3 | Status: SHIPPED | OUTPATIENT
Start: 2025-06-18

## 2025-06-19 LAB
CHOLEST SERPL-MCNC: 267 MG/DL
CHOLEST/HDLC SERPL: 4.5 (CALC)
EST. AVERAGE GLUCOSE BLD GHB EST-MCNC: 111 MG/DL
EST. AVERAGE GLUCOSE BLD GHB EST-SCNC: 6.2 MMOL/L
FERRITIN SERPL-MCNC: 84 NG/ML (ref 16–232)
HBA1C MFR BLD: 5.5 %
HDLC SERPL-MCNC: 60 MG/DL
HLA-B27 QL NAA+PROBE: NORMAL
IRON SATN MFR SERPL: 37 % (CALC) (ref 16–45)
IRON SERPL-MCNC: 122 MCG/DL (ref 45–160)
LDLC SERPL CALC-MCNC: 169 MG/DL (CALC)
NONHDLC SERPL-MCNC: 207 MG/DL (CALC)
QUEST HLAB27 TYPING RESULTS REVIEWED BY:: NORMAL
RHEUMATOID FACT SERPL-ACNC: <10 IU/ML
T3FREE SERPL-MCNC: 2.6 PG/ML (ref 2.3–4.2)
T4 FREE SERPL-MCNC: 1.2 NG/DL (ref 0.8–1.8)
TIBC SERPL-MCNC: 333 MCG/DL (CALC) (ref 250–450)
TRIGL SERPL-MCNC: 224 MG/DL
TSH SERPL-ACNC: 1.35 MIU/L (ref 0.4–4.5)
URATE SERPL-MCNC: 4.9 MG/DL (ref 2.5–7)
VIT B12 SERPL-MCNC: 638 PG/ML (ref 200–1100)

## 2025-06-19 ASSESSMENT — ENCOUNTER SYMPTOMS
FREQUENCY: 0
NAUSEA: 0
CONFUSION: 0
CHEST TIGHTNESS: 0
WHEEZING: 0
WOUND: 0
NECK PAIN: 0
JOINT SWELLING: 0
ACTIVITY CHANGE: 0
COUGH: 0
SEIZURES: 0
FEVER: 0
ARTHRALGIAS: 1
NERVOUS/ANXIOUS: 0
UNEXPECTED WEIGHT CHANGE: 0
DYSURIA: 0
FLANK PAIN: 0
FATIGUE: 1
SORE THROAT: 0
HEADACHES: 0
COLOR CHANGE: 0
VOICE CHANGE: 0
SLEEP DISTURBANCE: 0
PALPITATIONS: 1
APPETITE CHANGE: 0
PHOTOPHOBIA: 0
WEAKNESS: 0
SPEECH DIFFICULTY: 0
HEMATURIA: 0
DIARRHEA: 0
EYE PAIN: 0
ABDOMINAL PAIN: 0
SINUS PAIN: 0
STRIDOR: 0
DIZZINESS: 0
NUMBNESS: 0
TROUBLE SWALLOWING: 0
CONSTIPATION: 0
HALLUCINATIONS: 0
BACK PAIN: 0
FACIAL ASYMMETRY: 0
ADENOPATHY: 0
SHORTNESS OF BREATH: 0
TREMORS: 0
MYALGIAS: 1

## 2025-06-19 NOTE — PROGRESS NOTES
"Subjective   Patient ID: Chelsie Lucio is a 60 y.o. female who presents for Follow-up.    HPI   Patient presented to the office for follow up.   She need medicine refills.    Also she has palpitations spells and had holter monitor was placed in the past but no abnormality was found.     EKG has been done today but no significant changes from before.    Review of Systems   Constitutional:  Positive for fatigue. Negative for activity change, appetite change, fever and unexpected weight change.   HENT:  Negative for congestion, dental problem, ear discharge, ear pain, nosebleeds, postnasal drip, sinus pain, sore throat, trouble swallowing and voice change.    Eyes:  Negative for photophobia, pain and visual disturbance.   Respiratory:  Negative for cough, chest tightness, shortness of breath, wheezing and stridor.    Cardiovascular:  Positive for palpitations and leg swelling. Negative for chest pain.   Gastrointestinal:  Negative for abdominal pain, constipation, diarrhea and nausea.   Endocrine: Negative for polyuria.   Genitourinary:  Negative for decreased urine volume, dysuria, flank pain, frequency, hematuria and urgency.   Musculoskeletal:  Positive for arthralgias and myalgias. Negative for back pain, gait problem, joint swelling and neck pain.   Skin:  Negative for color change, rash and wound.   Allergic/Immunologic: Negative for environmental allergies and food allergies.   Neurological:  Negative for dizziness, tremors, seizures, syncope, facial asymmetry, speech difficulty, weakness, numbness and headaches.   Hematological:  Negative for adenopathy.   Psychiatric/Behavioral:  Negative for behavioral problems, confusion, hallucinations, sleep disturbance and suicidal ideas. The patient is not nervous/anxious.      Objective   /82   Pulse 67   Temp 36 °C (96.8 °F)   Ht 1.651 m (5' 5\")   Wt 110 kg (242 lb)   SpO2 98%   BMI 40.27 kg/m²     Physical Exam  Vitals and nursing note reviewed. "   Constitutional:       General: She is not in acute distress.     Appearance: Normal appearance. She is not ill-appearing or toxic-appearing.   HENT:      Head: Normocephalic.      Nose: Nose normal. No congestion.   Eyes:      Conjunctiva/sclera: Conjunctivae normal.   Cardiovascular:      Rate and Rhythm: Normal rate and regular rhythm.      Pulses: Normal pulses.      Heart sounds: Normal heart sounds. No murmur heard.  Pulmonary:      Effort: Pulmonary effort is normal. No respiratory distress.      Breath sounds: Normal breath sounds. No stridor. No wheezing, rhonchi or rales.   Musculoskeletal:         General: No deformity. Normal range of motion.      Cervical back: Normal range of motion. No rigidity.   Skin:     Coloration: Skin is not jaundiced.      Findings: No rash.   Neurological:      General: No focal deficit present.      Mental Status: She is alert and oriented to person, place, and time.      Coordination: Coordination normal.      Gait: Gait normal.   Psychiatric:         Mood and Affect: Mood normal.         Behavior: Behavior normal.       Assessment/Plan   Problem List Items Addressed This Visit       Arthritis    Relevant Orders    HLAB27 Typing    Uric acid    Rheumatoid Factor    Anemia, unspecified    Relevant Orders    Iron and TIBC    Ferritin    Gastroesophageal reflux disease    Relevant Medications    omeprazole (PriLOSEC) 40 mg DR capsule    Hyperlipidemia    Relevant Orders    Lipid panel    Hypothyroidism    Relevant Orders    TSH    T4, free    T3, free    Primary insomnia - Primary    Relevant Medications    eszopiclone (Lunesta) 3 mg tablet    Vitamin B12 deficiency    Relevant Orders    Vitamin B12    Vitamin D deficiency    Relevant Medications    Vitamin D3 125 mcg (5,000 unit) tablet    Vaginal dryness    Relevant Medications    estradiol (Aymilex) 0.05 mg/24 hr patch     Other Visit Diagnoses         Shortness of breath        Relevant Medications    fluticasone (Flonase)  50 mcg/actuation nasal spray    montelukast (Singulair) 10 mg tablet      Abnormal glucose        Relevant Orders    Hemoglobin A1C      Breast cancer screening by mammogram        Relevant Orders    BI mammo bilateral screening tomosynthesis

## 2025-06-24 LAB
CHOLEST SERPL-MCNC: 267 MG/DL
CHOLEST/HDLC SERPL: 4.5 (CALC)
EST. AVERAGE GLUCOSE BLD GHB EST-MCNC: 111 MG/DL
EST. AVERAGE GLUCOSE BLD GHB EST-SCNC: 6.2 MMOL/L
FERRITIN SERPL-MCNC: 84 NG/ML (ref 16–232)
HBA1C MFR BLD: 5.5 %
HDLC SERPL-MCNC: 60 MG/DL
HLA-B27 QL NAA+PROBE: NEGATIVE
IRON SATN MFR SERPL: 37 % (CALC) (ref 16–45)
IRON SERPL-MCNC: 122 MCG/DL (ref 45–160)
LDLC SERPL CALC-MCNC: 169 MG/DL (CALC)
NONHDLC SERPL-MCNC: 207 MG/DL (CALC)
QUEST HLAB27 TYPING RESULTS REVIEWED BY:: NORMAL
RHEUMATOID FACT SERPL-ACNC: <10 IU/ML
T3FREE SERPL-MCNC: 2.6 PG/ML (ref 2.3–4.2)
T4 FREE SERPL-MCNC: 1.2 NG/DL (ref 0.8–1.8)
TIBC SERPL-MCNC: 333 MCG/DL (CALC) (ref 250–450)
TRIGL SERPL-MCNC: 224 MG/DL
TSH SERPL-ACNC: 1.35 MIU/L (ref 0.4–4.5)
URATE SERPL-MCNC: 4.9 MG/DL (ref 2.5–7)
VIT B12 SERPL-MCNC: 638 PG/ML (ref 200–1100)

## 2025-07-18 ENCOUNTER — TELEPHONE (OUTPATIENT)
Dept: PRIMARY CARE | Facility: CLINIC | Age: 60
End: 2025-07-18
Payer: COMMERCIAL

## 2025-07-18 NOTE — TELEPHONE ENCOUNTER
Dr Mcclain put in orders for mammogram and bone density, they need those to be faxed over to [phone]

## 2025-07-21 DIAGNOSIS — K21.9 GASTROESOPHAGEAL REFLUX DISEASE WITHOUT ESOPHAGITIS: ICD-10-CM

## 2025-07-21 DIAGNOSIS — E55.9 VITAMIN D DEFICIENCY: ICD-10-CM

## 2025-07-21 DIAGNOSIS — J30.9 ALLERGIC RHINITIS, UNSPECIFIED SEASONALITY, UNSPECIFIED TRIGGER: ICD-10-CM

## 2025-07-21 DIAGNOSIS — R06.02 SHORTNESS OF BREATH: ICD-10-CM

## 2025-07-21 DIAGNOSIS — N89.8 VAGINAL DRYNESS: ICD-10-CM

## 2025-07-28 DIAGNOSIS — E55.9 VITAMIN D DEFICIENCY: ICD-10-CM

## 2025-07-28 DIAGNOSIS — K21.9 GASTROESOPHAGEAL REFLUX DISEASE WITHOUT ESOPHAGITIS: ICD-10-CM

## 2025-07-28 DIAGNOSIS — J30.9 ALLERGIC RHINITIS, UNSPECIFIED SEASONALITY, UNSPECIFIED TRIGGER: ICD-10-CM

## 2025-07-28 DIAGNOSIS — F51.01 PRIMARY INSOMNIA: ICD-10-CM

## 2025-07-28 DIAGNOSIS — N89.8 VAGINAL DRYNESS: ICD-10-CM

## 2025-07-28 RX ORDER — ESTRADIOL 0.05 MG/D
FILM, EXTENDED RELEASE TRANSDERMAL
Qty: 60 PATCH | Refills: 3 | Status: SHIPPED | OUTPATIENT
Start: 2025-07-28

## 2025-07-28 RX ORDER — OMEPRAZOLE 40 MG/1
40 CAPSULE, DELAYED RELEASE ORAL
Qty: 90 CAPSULE | Refills: 3 | Status: SHIPPED | OUTPATIENT
Start: 2025-07-28 | End: 2026-07-28

## 2025-07-28 RX ORDER — FLUTICASONE PROPIONATE 50 MCG
1 SPRAY, SUSPENSION (ML) NASAL DAILY PRN
Qty: 3 ML | Refills: 2 | Status: SHIPPED | OUTPATIENT
Start: 2025-07-28 | End: 2026-01-24

## 2025-07-28 RX ORDER — ACETAMINOPHEN 500 MG
125 TABLET ORAL DAILY
Qty: 90 TABLET | Refills: 3 | Status: SHIPPED | OUTPATIENT
Start: 2025-07-28 | End: 2026-07-28

## 2025-07-28 RX ORDER — ESZOPICLONE 3 MG/1
3 TABLET, FILM COATED ORAL NIGHTLY
Qty: 90 TABLET | Refills: 0 | Status: SHIPPED | OUTPATIENT
Start: 2025-07-28 | End: 2025-10-26

## 2025-08-06 RX ORDER — ESTRADIOL 0.05 MG/D
FILM, EXTENDED RELEASE TRANSDERMAL
Qty: 90 PATCH | Refills: 3 | Status: SHIPPED | OUTPATIENT
Start: 2025-08-06

## 2025-08-06 RX ORDER — ACETAMINOPHEN 500 MG
125 TABLET ORAL DAILY
Qty: 90 TABLET | Refills: 3 | Status: SHIPPED | OUTPATIENT
Start: 2025-08-06 | End: 2026-08-06

## 2025-08-06 RX ORDER — MONTELUKAST SODIUM 10 MG/1
10 TABLET ORAL NIGHTLY
Qty: 90 TABLET | Refills: 3 | Status: SHIPPED | OUTPATIENT
Start: 2025-08-06 | End: 2026-08-06

## 2025-08-06 RX ORDER — OMEPRAZOLE 40 MG/1
40 CAPSULE, DELAYED RELEASE ORAL
Qty: 90 CAPSULE | Refills: 3 | Status: SHIPPED | OUTPATIENT
Start: 2025-08-06 | End: 2026-08-06

## 2025-08-06 RX ORDER — FLUTICASONE PROPIONATE 50 MCG
2 SPRAY, SUSPENSION (ML) NASAL DAILY
Qty: 3 ML | Refills: 3 | Status: SHIPPED | OUTPATIENT
Start: 2025-08-06 | End: 2025-11-04

## 2025-08-07 ENCOUNTER — TELEPHONE (OUTPATIENT)
Dept: PRIMARY CARE | Facility: CLINIC | Age: 60
End: 2025-08-07
Payer: COMMERCIAL

## 2025-08-26 ENCOUNTER — OFFICE VISIT (OUTPATIENT)
Dept: URGENT CARE | Facility: URGENT CARE | Age: 60
End: 2025-08-26
Payer: COMMERCIAL

## 2025-08-26 ENCOUNTER — ANCILLARY PROCEDURE (OUTPATIENT)
Dept: URGENT CARE | Facility: URGENT CARE | Age: 60
End: 2025-08-26
Payer: COMMERCIAL

## 2025-08-26 VITALS
SYSTOLIC BLOOD PRESSURE: 142 MMHG | HEIGHT: 65 IN | OXYGEN SATURATION: 97 % | BODY MASS INDEX: 40.62 KG/M2 | RESPIRATION RATE: 20 BRPM | HEART RATE: 70 BPM | WEIGHT: 243.83 LBS | DIASTOLIC BLOOD PRESSURE: 85 MMHG | TEMPERATURE: 98.7 F

## 2025-08-26 DIAGNOSIS — J01.00 ACUTE NON-RECURRENT MAXILLARY SINUSITIS: ICD-10-CM

## 2025-08-26 DIAGNOSIS — R05.1 ACUTE COUGH: ICD-10-CM

## 2025-08-26 DIAGNOSIS — J45.21 MILD INTERMITTENT ASTHMA WITH EXACERBATION (HHS-HCC): ICD-10-CM

## 2025-08-26 DIAGNOSIS — R05.1 ACUTE COUGH: Primary | ICD-10-CM

## 2025-08-26 PROCEDURE — 99214 OFFICE O/P EST MOD 30 MIN: CPT | Performed by: NURSE PRACTITIONER

## 2025-08-26 PROCEDURE — 71046 X-RAY EXAM CHEST 2 VIEWS: CPT | Performed by: NURSE PRACTITIONER

## 2025-08-26 PROCEDURE — 1036F TOBACCO NON-USER: CPT | Performed by: NURSE PRACTITIONER

## 2025-08-26 PROCEDURE — 3008F BODY MASS INDEX DOCD: CPT | Performed by: NURSE PRACTITIONER

## 2025-08-26 RX ORDER — PREDNISONE 20 MG/1
40 TABLET ORAL DAILY
Qty: 10 TABLET | Refills: 0 | Status: SHIPPED | OUTPATIENT
Start: 2025-08-26 | End: 2025-08-31

## 2025-08-26 RX ORDER — DOXYCYCLINE HYCLATE 100 MG
100 TABLET ORAL 2 TIMES DAILY
Qty: 14 TABLET | Refills: 0 | Status: SHIPPED | OUTPATIENT
Start: 2025-08-26 | End: 2025-09-02

## 2025-08-26 ASSESSMENT — ENCOUNTER SYMPTOMS
COUGH: 1
MYALGIAS: 0
CHILLS: 0
RHINORRHEA: 1
SHORTNESS OF BREATH: 1
FEVER: 0
FATIGUE: 0
WHEEZING: 0
LIGHT-HEADEDNESS: 0
DIZZINESS: 0